# Patient Record
Sex: MALE | Race: WHITE | Employment: OTHER | URBAN - METROPOLITAN AREA
[De-identification: names, ages, dates, MRNs, and addresses within clinical notes are randomized per-mention and may not be internally consistent; named-entity substitution may affect disease eponyms.]

---

## 2017-08-24 ENCOUNTER — APPOINTMENT (OUTPATIENT)
Dept: GENERAL RADIOLOGY | Age: 62
DRG: 871 | End: 2017-08-24
Attending: EMERGENCY MEDICINE
Payer: COMMERCIAL

## 2017-08-24 ENCOUNTER — HOSPITAL ENCOUNTER (INPATIENT)
Age: 62
LOS: 3 days | Discharge: HOME | DRG: 871 | End: 2017-08-28
Attending: EMERGENCY MEDICINE | Admitting: FAMILY MEDICINE
Payer: COMMERCIAL

## 2017-08-24 DIAGNOSIS — E86.0 DEHYDRATION: ICD-10-CM

## 2017-08-24 DIAGNOSIS — J18.9 COMMUNITY ACQUIRED PNEUMONIA: Primary | ICD-10-CM

## 2017-08-24 DIAGNOSIS — E87.6 HYPOKALEMIA: ICD-10-CM

## 2017-08-24 LAB
ALBUMIN SERPL-MCNC: 2.5 G/DL (ref 3.5–5)
ALBUMIN/GLOB SERPL: 0.4 {RATIO} (ref 1.1–2.2)
ALP SERPL-CCNC: 108 U/L (ref 45–117)
ALT SERPL-CCNC: 52 U/L (ref 12–78)
ANION GAP SERPL CALC-SCNC: 6 MMOL/L (ref 5–15)
APPEARANCE UR: CLEAR
AST SERPL-CCNC: 62 U/L (ref 15–37)
BACTERIA URNS QL MICRO: NEGATIVE /HPF
BASOPHILS # BLD: 0 K/UL (ref 0–0.1)
BASOPHILS NFR BLD: 1 % (ref 0–1)
BILIRUB SERPL-MCNC: 0.4 MG/DL (ref 0.2–1)
BILIRUB UR QL CFM: NEGATIVE
BUN SERPL-MCNC: 23 MG/DL (ref 6–20)
BUN/CREAT SERPL: 28 (ref 12–20)
CALCIUM SERPL-MCNC: 8 MG/DL (ref 8.5–10.1)
CHLORIDE SERPL-SCNC: 100 MMOL/L (ref 97–108)
CO2 SERPL-SCNC: 30 MMOL/L (ref 21–32)
COLOR UR: ABNORMAL
CREAT SERPL-MCNC: 0.82 MG/DL (ref 0.7–1.3)
DIFFERENTIAL METHOD BLD: ABNORMAL
EOSINOPHIL # BLD: 0.1 K/UL (ref 0–0.4)
EOSINOPHIL NFR BLD: 2 % (ref 0–7)
EPITH CASTS URNS QL MICRO: ABNORMAL /LPF
ERYTHROCYTE [DISTWIDTH] IN BLOOD BY AUTOMATED COUNT: 14.9 % (ref 11.5–14.5)
GLOBULIN SER CALC-MCNC: 5.7 G/DL (ref 2–4)
GLUCOSE SERPL-MCNC: 105 MG/DL (ref 65–100)
GLUCOSE UR STRIP.AUTO-MCNC: NEGATIVE MG/DL
GRAN CASTS URNS QL MICRO: ABNORMAL /LPF
HCT VFR BLD AUTO: 34 % (ref 36.6–50.3)
HGB BLD-MCNC: 11.4 G/DL (ref 12.1–17)
HGB UR QL STRIP: ABNORMAL
HYALINE CASTS URNS QL MICRO: ABNORMAL /LPF (ref 0–5)
KETONES UR QL STRIP.AUTO: ABNORMAL MG/DL
LACTATE SERPL-SCNC: 1 MMOL/L (ref 0.4–2)
LEUKOCYTE ESTERASE UR QL STRIP.AUTO: NEGATIVE
LYMPHOCYTES # BLD: 0.5 K/UL (ref 0.8–3.5)
LYMPHOCYTES NFR BLD: 17 % (ref 12–49)
MCH RBC QN AUTO: 29.1 PG (ref 26–34)
MCHC RBC AUTO-ENTMCNC: 33.5 G/DL (ref 30–36.5)
MCV RBC AUTO: 86.7 FL (ref 80–99)
MONOCYTES # BLD: 0.3 K/UL (ref 0–1)
MONOCYTES NFR BLD: 11 % (ref 5–13)
MUCOUS THREADS URNS QL MICRO: ABNORMAL /LPF
NEUTS BAND NFR BLD MANUAL: 2 % (ref 0–6)
NEUTS SEG # BLD: 2 K/UL (ref 1.8–8)
NEUTS SEG NFR BLD: 67 % (ref 32–75)
NITRITE UR QL STRIP.AUTO: NEGATIVE
PH UR STRIP: 5.5 [PH] (ref 5–8)
PLATELET # BLD AUTO: 303 K/UL (ref 150–400)
POTASSIUM SERPL-SCNC: 2.6 MMOL/L (ref 3.5–5.1)
PROT SERPL-MCNC: 8.2 G/DL (ref 6.4–8.2)
PROT UR STRIP-MCNC: 300 MG/DL
RBC # BLD AUTO: 3.92 M/UL (ref 4.1–5.7)
RBC #/AREA URNS HPF: ABNORMAL /HPF (ref 0–5)
RBC MORPH BLD: ABNORMAL
SODIUM SERPL-SCNC: 136 MMOL/L (ref 136–145)
SP GR UR REFRACTOMETRY: 1.03 (ref 1–1.03)
UROBILINOGEN UR QL STRIP.AUTO: 1 EU/DL (ref 0.2–1)
WBC # BLD AUTO: 2.9 K/UL (ref 4.1–11.1)
WBC MORPH BLD: ABNORMAL
WBC URNS QL MICRO: ABNORMAL /HPF (ref 0–4)

## 2017-08-24 PROCEDURE — 83605 ASSAY OF LACTIC ACID: CPT | Performed by: EMERGENCY MEDICINE

## 2017-08-24 PROCEDURE — 96366 THER/PROPH/DIAG IV INF ADDON: CPT

## 2017-08-24 PROCEDURE — 96365 THER/PROPH/DIAG IV INF INIT: CPT

## 2017-08-24 PROCEDURE — 77030029684 HC NEB SM VOL KT MONA -A

## 2017-08-24 PROCEDURE — 81001 URINALYSIS AUTO W/SCOPE: CPT | Performed by: EMERGENCY MEDICINE

## 2017-08-24 PROCEDURE — 99284 EMERGENCY DEPT VISIT MOD MDM: CPT

## 2017-08-24 PROCEDURE — 74011000258 HC RX REV CODE- 258: Performed by: EMERGENCY MEDICINE

## 2017-08-24 PROCEDURE — 74011250636 HC RX REV CODE- 250/636: Performed by: EMERGENCY MEDICINE

## 2017-08-24 PROCEDURE — 36415 COLL VENOUS BLD VENIPUNCTURE: CPT | Performed by: STUDENT IN AN ORGANIZED HEALTH CARE EDUCATION/TRAINING PROGRAM

## 2017-08-24 PROCEDURE — 96375 TX/PRO/DX INJ NEW DRUG ADDON: CPT

## 2017-08-24 PROCEDURE — 85025 COMPLETE CBC W/AUTO DIFF WBC: CPT | Performed by: STUDENT IN AN ORGANIZED HEALTH CARE EDUCATION/TRAINING PROGRAM

## 2017-08-24 PROCEDURE — 71020 XR CHEST PA LAT: CPT

## 2017-08-24 PROCEDURE — 87040 BLOOD CULTURE FOR BACTERIA: CPT | Performed by: EMERGENCY MEDICINE

## 2017-08-24 PROCEDURE — 74011250637 HC RX REV CODE- 250/637: Performed by: EMERGENCY MEDICINE

## 2017-08-24 PROCEDURE — 80053 COMPREHEN METABOLIC PANEL: CPT | Performed by: STUDENT IN AN ORGANIZED HEALTH CARE EDUCATION/TRAINING PROGRAM

## 2017-08-24 RX ORDER — KETOROLAC TROMETHAMINE 30 MG/ML
30 INJECTION, SOLUTION INTRAMUSCULAR; INTRAVENOUS ONCE
Status: DISCONTINUED | OUTPATIENT
Start: 2017-08-24 | End: 2017-08-24

## 2017-08-24 RX ORDER — ONDANSETRON 2 MG/ML
4 INJECTION INTRAMUSCULAR; INTRAVENOUS ONCE
Status: COMPLETED | OUTPATIENT
Start: 2017-08-24 | End: 2017-08-24

## 2017-08-24 RX ORDER — IBUPROFEN 600 MG/1
600 TABLET ORAL
Status: COMPLETED | OUTPATIENT
Start: 2017-08-24 | End: 2017-08-24

## 2017-08-24 RX ORDER — GUAIFENESIN 100 MG/5ML
200 SOLUTION ORAL
Status: COMPLETED | OUTPATIENT
Start: 2017-08-24 | End: 2017-08-24

## 2017-08-24 RX ORDER — ESOMEPRAZOLE MAGNESIUM 40 MG/1
40 CAPSULE, DELAYED RELEASE ORAL
COMMUNITY

## 2017-08-24 RX ORDER — POTASSIUM CHLORIDE 20MEQ/15ML
40 LIQUID (ML) ORAL
Status: COMPLETED | OUTPATIENT
Start: 2017-08-24 | End: 2017-08-24

## 2017-08-24 RX ORDER — ALBUTEROL SULFATE 0.83 MG/ML
2.5 SOLUTION RESPIRATORY (INHALATION)
Status: COMPLETED | OUTPATIENT
Start: 2017-08-24 | End: 2017-08-25

## 2017-08-24 RX ORDER — SENNOSIDES 8.6 MG/1
1 TABLET ORAL DAILY
COMMUNITY
End: 2019-02-18

## 2017-08-24 RX ORDER — LORATADINE 10 MG/1
10 TABLET ORAL DAILY
COMMUNITY

## 2017-08-24 RX ORDER — TAMSULOSIN HYDROCHLORIDE 0.4 MG/1
0.4 CAPSULE ORAL
COMMUNITY
End: 2019-02-18

## 2017-08-24 RX ADMIN — POTASSIUM CHLORIDE 40 MEQ: 20 SOLUTION ORAL at 23:22

## 2017-08-24 RX ADMIN — SODIUM CHLORIDE 1000 ML: 900 INJECTION, SOLUTION INTRAVENOUS at 22:52

## 2017-08-24 RX ADMIN — IBUPROFEN 600 MG: 600 TABLET, FILM COATED ORAL at 23:24

## 2017-08-24 RX ADMIN — GUAIFENESIN 200 MG: 100 SOLUTION ORAL at 23:23

## 2017-08-24 RX ADMIN — ONDANSETRON 4 MG: 2 INJECTION INTRAMUSCULAR; INTRAVENOUS at 23:23

## 2017-08-24 RX ADMIN — CEFTRIAXONE SODIUM 2 G: 2 INJECTION, POWDER, FOR SOLUTION INTRAMUSCULAR; INTRAVENOUS at 22:56

## 2017-08-24 RX ADMIN — AZITHROMYCIN MONOHYDRATE 500 MG: 500 INJECTION, POWDER, LYOPHILIZED, FOR SOLUTION INTRAVENOUS at 23:29

## 2017-08-24 NOTE — IP AVS SNAPSHOT
[address] [facility] [address]ounger [address] 
[phone] Patient: Orquidea Valencia MRN: [id number] LCA:[id number] You are allergic to the following Allergen Reactions Percocet (Oxycodone-Acetaminophen) Rash Recent Documentation Height Weight BMI Smoking Status 1.803 m 70.9 kg 21.79 kg/m2 Never Smoker Unresulted Labs Order Current Status FREE LIGHT CHAINS, KAPPA/LAMBDA, QT In process PROTEIN ELECTROPHORESIS In process CULTURE, BLOOD, PAIRED Preliminary result Emergency Contacts  (Rel.) Home Phone Work Phone Mobile Phone Dhruv Guillaume [phone] -- [phone] About your hospitalization You were admitted on:  August 25, 2017 You last received care in the:  [facility] You were discharged on:  August 28, 2017 Why you were hospitalized Your primary diagnosis was: Infection Due To Parainfluenza Virus 4 Your diagnoses also included:  Acute Hypokalemia, Tachycardia, Sepsis (Hcc), Multiple Myeloma (Hcc) Providers Seen During Your Hospitalizations Provider Role Specialty Primary office phone Jm Stevens MD Attending Provider Emergency Medicine [phone] Treva Márquez MD Attending Provider [facility] [phone] Omar Galloway MD Attending Provider Hospitalist [phone] Your Primary Care Physician (PCP) Primary Care Physician Office Phone Office Fax L[place], [address] [facility] [phone] Follow-up Information Follow up With Details Comments Contact Info Dario Landin MD Call in 1 day  [address] [facility] [address] [facility] [address] [phone] 
[phone] Current Discharge Medication List  
  
START taking these medications Dose & Instructions Dispensing Information Comments Morning Noon Evening Bedtime  
 albuterol 90 mcg/actuation inhaler Commonly known as:  PROVENTIL HFA, VENTOLIN HFA, PROAIR HFA Your last dose was: Your next dose is:    
   
   
 Dose:  1 Puff Take 1 Puff by inhalation every four (4) hours as needed for Wheezing or Shortness of Breath. Quantity:  1 Inhaler Refills:  2  
     
   
   
   
  
 fluticasone 50 mcg/actuation nasal spray Commonly known as:  Halie Falk Your last dose was: Your next dose is:    
   
   
 Dose:  2 Spray 2 Sprays by Both Nostrils route daily. Quantity:  1 Bottle Refills:  2  
     
   
   
   
  
 guaiFENesin 1,200 mg Ta12 ER tablet Commonly known as:  Tripl Your last dose was: Your next dose is:    
   
   
 Dose:  1200 mg Take 1 Tab by mouth two (2) times a day for 7 days. Indications: COUGH Quantity:  14 Tab Refills:  0  
     
   
   
   
  
 guaiFENesin-dextromethorphan 100-10 mg/5 mL syrup Commonly known as:  ROBITUSSIN DM Your last dose was: Your next dose is:    
   
   
 Dose:  10 mL Take 10 mL by mouth every six (6) hours as needed for up to 10 days. Indications: COUGH Quantity:  1 Bottle Refills:  0 CONTINUE these medications which have NOT CHANGED Dose & Instructions Dispensing Information Comments Morning Noon Evening Bedtime  
 acyclovir 400 mg tablet Commonly known as:  ZOVIRAX Your last dose was: Your next dose is:    
   
   
 Dose:  400 mg Take 400 mg by mouth two (2) times a day. Refills:  0  
     
   
   
   
  
 ASPIRIN CHILD PO Your last dose was: Your next dose is:    
   
   
 Dose:  81 mg Take 81 mg by mouth daily. Refills:  0  
     
   
   
   
  
 CALCIUM 600 + D 600-125 mg-unit Tab Generic drug:  calcium-cholecalciferol (d3) Your last dose was: Your next dose is:    
   
   
 Dose:  2 Tab Take 2 Tabs by mouth two (2) times a day. Refills:  0  
     
   
   
   
  
 citalopram 40 mg tablet Commonly known as:  Antony Ralph Your last dose was: Your next dose is:    
   
   
 Dose:  40 mg Take 40 mg by mouth daily. Refills:  0  
     
   
   
   
  
 esomeprazole 40 mg capsule Commonly known as:  Julio Cesar Medel Your last dose was: Your next dose is:    
   
   
 Dose:  40 mg Take 40 mg by mouth daily. Refills:  0  
     
   
   
   
  
 fentaNYL 100 mcg/hr PATCH Commonly known as:  Milton Cordon Your last dose was: Your next dose is:    
   
   
 Dose:  1 Patch 1 Patch by TransDERmal route every fourty-eight (48) hours. Refills:  0 HYDROmorphone 2 mg tablet Commonly known as:  DILAUDID Your last dose was: Your next dose is:    
   
   
 Dose:  2 mg Take 2 mg by mouth every four (4) hours as needed for Pain. Refills:  0  
     
   
   
   
  
 loratadine 10 mg tablet Commonly known as:  Luciano Munguia Your last dose was: Your next dose is:    
   
   
 Dose:  10 mg Take 10 mg by mouth daily. Refills:  0 Senna 8.6 mg tablet Generic drug:  senna Your last dose was: Your next dose is:    
   
   
 Dose:  1 Tab Take 1 Tab by mouth daily. Refills:  0 STOOL SOFTENER PO Your last dose was: Your next dose is:    
   
   
 Dose:  2 Cap Take 2 Caps by mouth two (2) times a day. Refills:  0  
     
   
   
   
  
 tamsulosin 0.4 mg capsule Commonly known as:  FLOMAX Your last dose was: Your next dose is:    
   
   
 Dose:  0.4 mg Take 0.4 mg by mouth nightly. Refills:  0 Where to Get Your Medications Information on where to get these meds will be given to you by the nurse or doctor. ! Ask your nurse or doctor about these medications  
  albuterol 90 mcg/actuation inhaler  
 fluticasone 50 mcg/actuation nasal spray  
 guaiFENesin 1,200 mg Ta12 ER tablet guaiFENesin-dextromethorphan 100-10 mg/5 mL syrup Discharge Instructions Discharge Instructions PATIENT ID: Clara Berrios MRN: [id number] YOB: 1955 DATE OF ADMISSION: 8/24/2017  9:46 PM   
DATE OF DISCHARGE: 8/28/2017 PRIMARY CARE PROVIDER: Nino Bonilla MD  
 
ATTENDING PHYSICIAN: Varun Green MD 
DISCHARGING PROVIDER: Andree Kanner, NP To contact this individual call [phone] and ask the  to page. If unavailable ask to be transferred the Adult Hospitalist Department. DISCHARGE DIAGNOSES Flu, Sepsis CONSULTATIONS: IP CONSULT TO ONCOLOGY 
IP CONSULT TO HEMATOLOGY PROCEDURES/SURGERIES: * No surgery found * PENDING TEST RESULTS:  
At the time of discharge the following test results are still pending: none FOLLOW UP APPOINTMENTS:  
Follow-up Information Follow up With Details Comments Contact Info Barbara Harris MD Call in 1 day  [address] [facility] [address] [facility] [address] [address] [phone] ADDITIONAL CARE RECOMMENDATIONS:  
1. Make sure to get plenty of rest and drink lots of water. 2. Please use your incentive spirometer 10 times a hour while awake. 3. Recommending you take Mucinex to help decrease your congestion. DIET: Regular ACTIVITY: as tolerated WOUND CARE: none EQUIPMENT needed: none DISCHARGE MEDICATIONS: 
 See Medication Reconciliation Form · It is important that you take the medication exactly as they are prescribed. · Keep your medication in the bottles provided by the pharmacist and keep a list of the medication names, dosages, and times to be taken in your wallet. · Do not take other medications without consulting your doctor. NOTIFY YOUR PHYSICIAN FOR ANY OF THE FOLLOWING:  
Fever over 101 degrees for 24 hours. Chest pain, shortness of breath, fever, chills, nausea, vomiting, diarrhea, change in mentation, falling, weakness, bleeding.  Severe pain or pain not relieved by medications. Or, any other signs or symptoms that you may have questions about. DISPOSITION: 
X  Home With: 
 OT  PT  [facility]  RN  
  
 SNF/Inpatient Rehab/LTAC Independent/assisted living Hospice Other: CDMP Checked: Yes X PROBLEM LIST Updated: Yes X Signed:  
Edouard Ortiz NP 
8/28/2017 
12:08 PM 
 
Discharge Instructions Attachments/References INFLUENZA (ENGLISH) Discharge Orders None [email or web address] Announcement We are excited to announce that we are making your provider's discharge notes available to you in [email or web address]. You will see these notes when they are completed and signed by the physician that discharged you from your recent hospital stay. If you have any questions or concerns about any information you see in [email or web address], please call the Health Information Department where you were seen or reach out to your Primary Care Provider for more information about your plan of care. Introducing [facility]! Dear Diana Pod: 
Thank you for requesting a [email or web address] account. Our records indicate that you already have an active [email or web address] account. You can access your account anytime at [email or web address] Did you know that you can access your hospital and ER discharge instructions at any time in [email or web address]? You can also review all of your test results from your hospital stay or ER visit. Additional Information If you have questions, please visit the Frequently Asked Questions section of the [email or web address] website at [email or web address] Remember, [email or web address] is NOT to be used for urgent needs. For medical emergencies, dial 911. Now available from your iPhone and Android! General Information Please provide this summary of care documentation to your next provider. Patient Signature:  ____________________________________________________________ Date:  ____________________________________________________________  
  
Katie De Leon Provider Signature:  ____________________________________________________________ Date:  ____________________________________________________________

## 2017-08-25 ENCOUNTER — APPOINTMENT (OUTPATIENT)
Dept: CT IMAGING | Age: 62
DRG: 871 | End: 2017-08-25
Attending: FAMILY MEDICINE
Payer: COMMERCIAL

## 2017-08-25 ENCOUNTER — APPOINTMENT (OUTPATIENT)
Dept: GENERAL RADIOLOGY | Age: 62
DRG: 871 | End: 2017-08-25
Attending: INTERNAL MEDICINE
Payer: COMMERCIAL

## 2017-08-25 PROBLEM — R50.9 FEVER: Status: ACTIVE | Noted: 2017-08-25

## 2017-08-25 PROBLEM — R00.0 TACHYCARDIA: Status: ACTIVE | Noted: 2017-08-25

## 2017-08-25 PROBLEM — E87.6 ACUTE HYPOKALEMIA: Status: ACTIVE | Noted: 2017-08-25

## 2017-08-25 LAB
ARTERIAL PATENCY WRIST A: YES
ATRIAL RATE: 79 BPM
B PERT DNA SPEC QL NAA+PROBE: NOT DETECTED
BASE EXCESS BLD CALC-SCNC: 2 MMOL/L
BDY SITE: ABNORMAL
C PNEUM DNA SPEC QL NAA+PROBE: NOT DETECTED
CALCULATED P AXIS, ECG09: 34 DEGREES
CALCULATED R AXIS, ECG10: -38 DEGREES
CALCULATED T AXIS, ECG11: 115 DEGREES
DIAGNOSIS, 93000: NORMAL
FLUAV H1 2009 PAND RNA SPEC QL NAA+PROBE: NOT DETECTED
FLUAV H1 RNA SPEC QL NAA+PROBE: NOT DETECTED
FLUAV H3 RNA SPEC QL NAA+PROBE: NOT DETECTED
FLUAV SUBTYP SPEC NAA+PROBE: NOT DETECTED
FLUBV RNA SPEC QL NAA+PROBE: NOT DETECTED
GAS FLOW.O2 O2 DELIVERY SYS: ABNORMAL L/MIN
GAS FLOW.O2 SETTING OXYMISER: 3 L/M
HADV DNA SPEC QL NAA+PROBE: NOT DETECTED
HCO3 BLD-SCNC: 26.6 MMOL/L (ref 22–26)
HCOV 229E RNA SPEC QL NAA+PROBE: NOT DETECTED
HCOV HKU1 RNA SPEC QL NAA+PROBE: NOT DETECTED
HCOV NL63 RNA SPEC QL NAA+PROBE: NOT DETECTED
HCOV OC43 RNA SPEC QL NAA+PROBE: NOT DETECTED
HMPV RNA SPEC QL NAA+PROBE: NOT DETECTED
HPIV1 RNA SPEC QL NAA+PROBE: NOT DETECTED
HPIV2 RNA SPEC QL NAA+PROBE: NOT DETECTED
HPIV3 RNA SPEC QL NAA+PROBE: NOT DETECTED
HPIV4 RNA SPEC QL NAA+PROBE: DETECTED
M PNEUMO DNA SPEC QL NAA+PROBE: NOT DETECTED
MAGNESIUM SERPL-MCNC: 2 MG/DL (ref 1.6–2.4)
O2/TOTAL GAS SETTING VFR VENT: 0.28 %
P-R INTERVAL, ECG05: 192 MS
PCO2 BLD: 44.5 MMHG (ref 35–45)
PH BLD: 7.38 [PH] (ref 7.35–7.45)
PO2 BLD: 64 MMHG (ref 80–100)
POTASSIUM SERPL-SCNC: 3.8 MMOL/L (ref 3.5–5.1)
Q-T INTERVAL, ECG07: 490 MS
QRS DURATION, ECG06: 174 MS
QTC CALCULATION (BEZET), ECG08: 561 MS
RSV RNA SPEC QL NAA+PROBE: NOT DETECTED
RV+EV RNA SPEC QL NAA+PROBE: NOT DETECTED
SAO2 % BLD: 92 % (ref 92–97)
SPECIMEN TYPE: ABNORMAL
TOTAL RESP. RATE, ITRR: 15
VENTRICULAR RATE, ECG03: 79 BPM

## 2017-08-25 PROCEDURE — 74011000258 HC RX REV CODE- 258: Performed by: FAMILY MEDICINE

## 2017-08-25 PROCEDURE — 74011250636 HC RX REV CODE- 250/636: Performed by: FAMILY MEDICINE

## 2017-08-25 PROCEDURE — 83735 ASSAY OF MAGNESIUM: CPT | Performed by: NURSE PRACTITIONER

## 2017-08-25 PROCEDURE — 87070 CULTURE OTHR SPECIMN AEROBIC: CPT | Performed by: HOSPITALIST

## 2017-08-25 PROCEDURE — 93041 RHYTHM ECG TRACING: CPT

## 2017-08-25 PROCEDURE — 84132 ASSAY OF SERUM POTASSIUM: CPT | Performed by: NURSE PRACTITIONER

## 2017-08-25 PROCEDURE — 74011636320 HC RX REV CODE- 636/320: Performed by: EMERGENCY MEDICINE

## 2017-08-25 PROCEDURE — 74011250637 HC RX REV CODE- 250/637: Performed by: NURSE PRACTITIONER

## 2017-08-25 PROCEDURE — 74011250636 HC RX REV CODE- 250/636: Performed by: HOSPITALIST

## 2017-08-25 PROCEDURE — 87798 DETECT AGENT NOS DNA AMP: CPT | Performed by: NURSE PRACTITIONER

## 2017-08-25 PROCEDURE — 71010 XR CHEST PORT: CPT

## 2017-08-25 PROCEDURE — 74011000258 HC RX REV CODE- 258: Performed by: EMERGENCY MEDICINE

## 2017-08-25 PROCEDURE — 36415 COLL VENOUS BLD VENIPUNCTURE: CPT | Performed by: NURSE PRACTITIONER

## 2017-08-25 PROCEDURE — 82803 BLOOD GASES ANY COMBINATION: CPT

## 2017-08-25 PROCEDURE — 74011250637 HC RX REV CODE- 250/637: Performed by: HOSPITALIST

## 2017-08-25 PROCEDURE — 74011250636 HC RX REV CODE- 250/636: Performed by: NURSE PRACTITIONER

## 2017-08-25 PROCEDURE — 94640 AIRWAY INHALATION TREATMENT: CPT

## 2017-08-25 PROCEDURE — 71275 CT ANGIOGRAPHY CHEST: CPT

## 2017-08-25 PROCEDURE — 74011000250 HC RX REV CODE- 250: Performed by: EMERGENCY MEDICINE

## 2017-08-25 PROCEDURE — 36600 WITHDRAWAL OF ARTERIAL BLOOD: CPT

## 2017-08-25 PROCEDURE — 77010033678 HC OXYGEN DAILY

## 2017-08-25 PROCEDURE — 74177 CT ABD & PELVIS W/CONTRAST: CPT

## 2017-08-25 PROCEDURE — 65270000029 HC RM PRIVATE

## 2017-08-25 RX ORDER — SODIUM CHLORIDE 9 MG/ML
100 INJECTION, SOLUTION INTRAVENOUS CONTINUOUS
Status: DISCONTINUED | OUTPATIENT
Start: 2017-08-25 | End: 2017-08-26

## 2017-08-25 RX ORDER — SODIUM CHLORIDE 0.9 % (FLUSH) 0.9 %
5-10 SYRINGE (ML) INJECTION AS NEEDED
Status: DISCONTINUED | OUTPATIENT
Start: 2017-08-25 | End: 2017-08-28 | Stop reason: HOSPADM

## 2017-08-25 RX ORDER — GUAIFENESIN/DEXTROMETHORPHAN 100-10MG/5
10 SYRUP ORAL
Status: DISCONTINUED | OUTPATIENT
Start: 2017-08-25 | End: 2017-08-28 | Stop reason: HOSPADM

## 2017-08-25 RX ORDER — CITALOPRAM 40 MG/1
40 TABLET, FILM COATED ORAL DAILY
COMMUNITY

## 2017-08-25 RX ORDER — KETOROLAC TROMETHAMINE 30 MG/ML
15 INJECTION, SOLUTION INTRAMUSCULAR; INTRAVENOUS
Status: DISCONTINUED | OUTPATIENT
Start: 2017-08-25 | End: 2017-08-28 | Stop reason: HOSPADM

## 2017-08-25 RX ORDER — FENTANYL 100 UG/H
1 PATCH TRANSDERMAL
Status: DISCONTINUED | OUTPATIENT
Start: 2017-08-25 | End: 2017-08-28 | Stop reason: HOSPADM

## 2017-08-25 RX ORDER — SODIUM CHLORIDE 0.9 % (FLUSH) 0.9 %
5-10 SYRINGE (ML) INJECTION EVERY 8 HOURS
Status: DISCONTINUED | OUTPATIENT
Start: 2017-08-25 | End: 2017-08-28 | Stop reason: HOSPADM

## 2017-08-25 RX ORDER — SODIUM CHLORIDE 0.9 % (FLUSH) 0.9 %
10 SYRINGE (ML) INJECTION
Status: COMPLETED | OUTPATIENT
Start: 2017-08-25 | End: 2017-08-25

## 2017-08-25 RX ORDER — ONDANSETRON 2 MG/ML
4 INJECTION INTRAMUSCULAR; INTRAVENOUS
Status: DISCONTINUED | OUTPATIENT
Start: 2017-08-25 | End: 2017-08-28 | Stop reason: HOSPADM

## 2017-08-25 RX ORDER — TAMSULOSIN HYDROCHLORIDE 0.4 MG/1
0.4 CAPSULE ORAL DAILY
Status: DISCONTINUED | OUTPATIENT
Start: 2017-08-26 | End: 2017-08-25

## 2017-08-25 RX ORDER — IPRATROPIUM BROMIDE AND ALBUTEROL SULFATE 2.5; .5 MG/3ML; MG/3ML
3 SOLUTION RESPIRATORY (INHALATION)
Status: DISCONTINUED | OUTPATIENT
Start: 2017-08-25 | End: 2017-08-28 | Stop reason: HOSPADM

## 2017-08-25 RX ORDER — ENOXAPARIN SODIUM 100 MG/ML
40 INJECTION SUBCUTANEOUS EVERY 24 HOURS
Status: DISCONTINUED | OUTPATIENT
Start: 2017-08-25 | End: 2017-08-28 | Stop reason: HOSPADM

## 2017-08-25 RX ORDER — TAMSULOSIN HYDROCHLORIDE 0.4 MG/1
0.4 CAPSULE ORAL
Status: DISCONTINUED | OUTPATIENT
Start: 2017-08-25 | End: 2017-08-28 | Stop reason: HOSPADM

## 2017-08-25 RX ORDER — CITALOPRAM 20 MG/1
40 TABLET, FILM COATED ORAL
Status: DISCONTINUED | OUTPATIENT
Start: 2017-08-26 | End: 2017-08-28 | Stop reason: HOSPADM

## 2017-08-25 RX ADMIN — GUAIFENESIN AND DEXTROMETHORPHAN 10 ML: 100; 10 SYRUP ORAL at 19:35

## 2017-08-25 RX ADMIN — SODIUM CHLORIDE 100 ML/HR: 900 INJECTION, SOLUTION INTRAVENOUS at 19:00

## 2017-08-25 RX ADMIN — Medication 10 ML: at 21:47

## 2017-08-25 RX ADMIN — AZITHROMYCIN MONOHYDRATE 500 MG: 500 INJECTION, POWDER, LYOPHILIZED, FOR SOLUTION INTRAVENOUS at 23:47

## 2017-08-25 RX ADMIN — SODIUM CHLORIDE 1000 ML: 900 INJECTION, SOLUTION INTRAVENOUS at 02:24

## 2017-08-25 RX ADMIN — SODIUM CHLORIDE 100 ML: 900 INJECTION, SOLUTION INTRAVENOUS at 02:09

## 2017-08-25 RX ADMIN — Medication 10 ML: at 13:25

## 2017-08-25 RX ADMIN — SODIUM CHLORIDE 125 ML/HR: 900 INJECTION, SOLUTION INTRAVENOUS at 11:23

## 2017-08-25 RX ADMIN — ENOXAPARIN SODIUM 40 MG: 40 INJECTION SUBCUTANEOUS at 18:58

## 2017-08-25 RX ADMIN — KETOROLAC TROMETHAMINE 15 MG: 30 INJECTION, SOLUTION INTRAMUSCULAR at 17:32

## 2017-08-25 RX ADMIN — IOPAMIDOL 100 ML: 755 INJECTION, SOLUTION INTRAVENOUS at 02:10

## 2017-08-25 RX ADMIN — ONDANSETRON 4 MG: 2 INJECTION INTRAMUSCULAR; INTRAVENOUS at 13:25

## 2017-08-25 RX ADMIN — ALBUTEROL SULFATE 2.5 MG: 2.5 SOLUTION RESPIRATORY (INHALATION) at 00:06

## 2017-08-25 RX ADMIN — Medication 10 ML: at 02:10

## 2017-08-25 RX ADMIN — CEFTRIAXONE 1 G: 1 INJECTION, POWDER, FOR SOLUTION INTRAMUSCULAR; INTRAVENOUS at 21:51

## 2017-08-25 RX ADMIN — Medication 10 ML: at 06:00

## 2017-08-25 RX ADMIN — ONDANSETRON 4 MG: 2 INJECTION INTRAMUSCULAR; INTRAVENOUS at 19:01

## 2017-08-25 RX ADMIN — SODIUM CHLORIDE 125 ML/HR: 900 INJECTION, SOLUTION INTRAVENOUS at 02:24

## 2017-08-25 RX ADMIN — TAMSULOSIN HYDROCHLORIDE 0.4 MG: 0.4 CAPSULE ORAL at 22:03

## 2017-08-25 NOTE — CONSULTS
Pt. Is a 57 y/o man with myeloma who is seen  After admission for fever,  Pt.  Examined, chart reviewed   Consult dictated  Aylin Segundo MD

## 2017-08-25 NOTE — PROGRESS NOTES
Hospitalist Progress Note  Ruby Plascencia NP  Office: [phone]  Cell: [phone]      Date of Service:  2017  NAME:  Donovan Sheikh  :  1955  MRN:  [id number]      Admission Summary:   Mr. Lita Wilson is a 59 yo male with PMH significant for multiple myeloma and s/p stem cell transplant x 2. Presented to ED on  with fever, fatigue, feeling unwell, nausea/vomiting. Actively on chemotherapy followed by Dr. Racquel Mishra. Admitted for sepsis treatment and work up. Interval history / Subjective:   Still feeling generally unwell and nauseated. No diarrhea since Wednesday but no appetite at this time. He was afebrile all morning and spiked low grade fever last night. Reports sick contact with grandchildren this weekend who were diagnosed with viral illness.       Assessment & Plan:     Sepsis (POA)  - Fluid resuscitated and will continue IVF but decrease rate slightly  - Continue Azithro and Rocephin for PNA pending blood cultures  - Blood cultures pending  - UA negative  - Send viral respiratory PCR  - Lactate ok    Possible CAP (POA) with some dyspnea  - CT chest with possible infectious etiology and B/L tree-in-budd opacities  - CTA negative for PE  - Given immunocompromised will treat as infection  - Wean O2 as able    Multiple myeloma  - Followed by Dr. Racquel Mishra who has seen today and evaluated  - Chemo will be on hold until infection ruled out    Hypokalemia (POA)  - K+ 2.6 on admission and now 3.8 on recheck    Dehydration  - Renal function ok on arrival but minimal UO suspect due to poor PO intake and sepsis  - Responded well to IVF and UO much better    Generalized abdominal pain  - Resolved and described more as nauseated  - No acute process on CT A/P and only 1 episode of diarrhea on Wednesday  - D/C c diff testing as no stool     Code status: Full  DVT prophylaxis: Start Lovenox    Care Plan discussed with: Patient, Wife, RN, Attending  Disposition: Home when medically improved     Hospital Problems  Date Reviewed: 8/25/2017          Codes Class Noted POA    Acute hypokalemia ICD-10-CM: E87.6  ICD-9-CM: 276.8  8/25/2017 Unknown        Tachycardia ICD-10-CM: R00.0  ICD-9-CM: 785.0  8/25/2017 Unknown        * (Principal)Fever ICD-10-CM: R50.9  ICD-9-CM: 780.60  8/25/2017 Unknown                Review of Systems:   Denied CP, + non-productive cough when he talks, SOB not an issue when laying down, + nausea, no stool       Vital Signs:    Last 24hrs VS reviewed since prior progress note. Most recent are:  Visit Vitals    /67 (BP 1 Location: Right arm, BP Patient Position: At rest)    Pulse 90    Temp (!) 100.6 °F (38.1 °C)    Resp 18    Ht 5' 11\" (1.803 m)    Wt 70.9 kg (156 lb 3.2 oz)    SpO2 94%    BMI 21.79 kg/m2         Intake/Output Summary (Last 24 hours) at 08/25/17 1622  Last data filed at 08/25/17 1547   Gross per 24 hour   Intake             1579 ml   Output              950 ml   Net              629 ml        Physical Examination:             Constitutional:  No acute distress, cooperative, pleasant but looks unwell and ashen   ENT:  Oral mucous moist, oropharynx benign. Neck supple   Resp:  Scattered rhonchi worse to RUL but decent aeration. No accessory muscle use   CV:  Regular rhythm, normal rate, no murmurs, gallops, rubs    GI:  Soft, non distended, non tender. normoactive bowel sounds, no hepatosplenomegaly     Musculoskeletal:  No edema, warm, 2+ pulses throughout. WATSON with 5/5 strength    Neurologic:  Moves all extremities. AAOx3, CN II-XII reviewed and exam is non-focal     Psych:  Good insight, Not anxious nor agitated. Skin:  Good turgor, no rashes or ulcers  Eyes:  EOMI. Anicteric sclerae, PERRL.        Data Review:    Review and/or order of clinical lab test  Review and/or order of tests in the radiology section of CPT  Review and/or order of tests in the medicine section of CPT      Labs:     Recent Labs      08/24/17 2156   WBC  2.9*   HGB  11.4*   HCT  34.0*   PLT  303     Recent Labs      08/25/17   1258  08/24/17 2156   NA   --   136   K  3.8  2.6*   CL   --   100   CO2   --   30   BUN   --   23*   CREA   --   0.82   GLU   --   105*   CA   --   8.0*   MG  2.0   --      Recent Labs      08/24/17 2156   SGOT  62*   ALT  52   AP  108   TBILI  0.4   TP  8.2   ALB  2.5*   GLOB  5.7*     No results for input(s): INR, PTP, APTT in the last 72 hours. No lab exists for component: INREXT   No results for input(s): FE, TIBC, PSAT, FERR in the last 72 hours. No results found for: FOL, RBCF   No results for input(s): PH, PCO2, PO2 in the last 72 hours. No results for input(s): CPK, CKNDX, TROIQ in the last 72 hours.     No lab exists for component: CPKMB  No results found for: CHOL, CHOLX, CHLST, CHOLV, HDL, LDL, LDLC, DLDLP, TGLX, TRIGL, TRIGP, CHHD, CHHDX  No results found for: [facility]  Lab Results   Component Value Date/Time    Color DARK YELLOW 08/24/2017 11:04 PM    Appearance CLEAR 08/24/2017 11:04 PM    Specific gravity 1.030 08/24/2017 11:04 PM    pH (UA) 5.5 08/24/2017 11:04 PM    Protein 300 08/24/2017 11:04 PM    Glucose NEGATIVE  08/24/2017 11:04 PM    Ketone TRACE 08/24/2017 11:04 PM    Bilirubin NEGATIVE  12/31/2015 04:47 PM    Urobilinogen 1.0 08/24/2017 11:04 PM    Nitrites NEGATIVE  08/24/2017 11:04 PM    Leukocyte Esterase NEGATIVE  08/24/2017 11:04 PM    Epithelial cells FEW 08/24/2017 11:04 PM    Bacteria NEGATIVE  08/24/2017 11:04 PM    WBC 0-4 08/24/2017 11:04 PM    RBC 5-10 08/24/2017 11:04 PM         Medications Reviewed:     Current Facility-Administered Medications   Medication Dose Route Frequency    sodium chloride (NS) flush 5-10 mL  5-10 mL IntraVENous Q8H    sodium chloride (NS) flush 5-10 mL  5-10 mL IntraVENous PRN    0.9% sodium chloride infusion  100 mL/hr IntraVENous CONTINUOUS    cefTRIAXone (ROCEPHIN) 1 g in 0.9% sodium chloride (MBP/ADV) 50 mL  1 g IntraVENous Q24H  albuterol-ipratropium (DUO-NEB) 2.5 MG-0.5 MG/3 ML  3 mL Nebulization Q4H PRN    azithromycin (ZITHROMAX) 500 mg in 0.9% sodium chloride (MBP/ADV) 250 mL  500 mg IntraVENous Q24H    fentaNYL (DURAGESIC) 100 mcg/hr patch 1 Patch  1 Patch TransDERmal Q48H    ondansetron (ZOFRAN) injection 4 mg  4 mg IntraVENous Q6H PRN    tamsulosin (FLOMAX) capsule 0.4 mg  0.4 mg Oral QHS    [START ON 8/26/2017] citalopram (CELEXA) tablet 40 mg  40 mg Oral 7am     ______________________________________________________________________  EXPECTED LENGTH OF STAY: 2d 19h  ACTUAL LENGTH OF STAY:          0                 Lee Ann Garzon NP

## 2017-08-25 NOTE — ROUTINE PROCESS
TRANSFER - OUT REPORT:    Verbal report given to VEL Negro(name) on Carol Ratliff  being transferred to 6E(unit) for routine progression of care       Report consisted of patients Situation, Background, Assessment and   Recommendations(SBAR). Information from the following report(s) SBAR, Kardex, ED Summary, [facility] and Cardiac Rhythm NSR was reviewed with the receiving nurse. Lines:   Peripheral IV 08/24/17 Left Arm (Active)   Site Assessment Clean, dry, & intact 8/24/2017  9:50 PM   Phlebitis Assessment 0 8/24/2017  9:50 PM   Infiltration Assessment 0 8/24/2017  9:50 PM   Dressing Status Clean, dry, & intact 8/24/2017  9:50 PM       Peripheral IV 08/24/17 Right Wrist (Active)   Site Assessment Clean, dry, & intact 8/24/2017  9:50 PM   Phlebitis Assessment 0 8/24/2017  9:50 PM   Infiltration Assessment 0 8/24/2017  9:50 PM   Dressing Status Clean, dry, & intact 8/24/2017  9:50 PM        Opportunity for questions and clarification was provided.       Patient transported with:   O2 @ 2 liters        Visit Vitals    /67 (BP 1 Location: Right arm, BP Patient Position: At rest)    Pulse 79    Temp 97.3 °F (36.3 °C)    Resp 16    Ht 5' 11\" (1.803 m)    Wt 70.9 kg (156 lb 3.2 oz)    SpO2 97%    BMI 21.79 kg/m2

## 2017-08-25 NOTE — ED TRIAGE NOTES
TRIAGE NOTE: PT arrives for shortness of breath, cough, diarrhea, nausea, vomiting for a week.   Pt a multiple myleoma patient

## 2017-08-25 NOTE — PROGRESS NOTES
Patient seen and examined independently. He feels better, still very congested and has a productive cough. On exam, he's tachycardic, lungs are rhonchorous bilaterally. I agree with the NP's A&P. Will also send sputum for culture, guaifenesin-dextramethorphan for cough, IV ketorolac for pain related to cough.

## 2017-08-25 NOTE — PROGRESS NOTES
Admission Medication Reconciliation:    Information obtained from: Patient and spouse    Significant PMH/Disease States:   Past Medical History:   Diagnosis Date    Hypercholesteremia     Myeloma [facility])        Chief Complaint for this Admission:  SOB     Allergies:  Percocet [oxycodone-acetaminophen]    Prior to Admission Medications:   Prior to Admission Medications   Prescriptions Last Dose Informant Patient Reported? Taking? ASPIRIN CHILD PO 2017 at Unknown time  Yes Yes   Sig: Take 81 mg by mouth daily. DOCUSATE CALCIUM (STOOL SOFTENER PO) 2017 at Unknown time  Yes Yes   Sig: Take 2 Caps by mouth two (2) times a day. HYDROmorphone (DILAUDID) 2 mg tablet   Yes Yes   Sig: Take 2 mg by mouth every four (4) hours as needed for Pain. acyclovir (ZOVIRAX) 400 mg tablet 2017 at Unknown time  Yes Yes   Sig: Take 400 mg by mouth two (2) times a day. calcium-cholecalciferol, d3, (CALCIUM 600 + D) 600-125 mg-unit tab 2017 at Unknown time  Yes Yes   Sig: Take 2 Tabs by mouth two (2) times a day. esomeprazole (NEXIUM) 40 mg capsule 2017 at Unknown time  Yes Yes   Sig: Take 40 mg by mouth daily. fentaNYL (DURAGESIC) 100 mcg/hr PATCH 2017 at Unknown time  Yes Yes   Si Patch by TransDERmal route every fourty-eight (48) hours. loratadine (CLARITIN) 10 mg tablet 2017 at Unknown time  Yes Yes   Sig: Take 10 mg by mouth daily. senna (SENNA) 8.6 mg tablet   Yes Yes   Sig: Take 1 Tab by mouth daily. tamsulosin (FLOMAX) 0.4 mg capsule   Yes Yes   Sig: Take 0.4 mg by mouth nightly. Facility-Administered Medications: None         Comments/Recommendations: Reviewed medications with patient and spouse. Removed: Alprazolam, Dexamethasone, Centrum Silver, Ranitidine, Ondansetron    Add: Claritin, Tamsulosin, Senna, Calcium    Also takes MM drug believed to be Pomalyst, he takes 1 daily for 21 days. He is currently on cycle and is scheduled to end on Tuesday.   Please review with patient to clarify correct medication.     Lucy Rosales, PharmD

## 2017-08-25 NOTE — CDMP QUERY
Dear Hospitalists,    Please clarify if this patient is being treated/managed for:    =>Sepsis (POA) in the setting of fever with hx multiple myeloma requiring IVF bolus, lab monitoring, rocephin/zithromax  =>Other Explanation of clinical findings  =>Unable to Determine (no explanation of clinical findings)    The medical record reflects the following clinical findings, treatment, and risk factors:    Risk Factors: 63yo cc Fever, cough, nausea/ vomiting  Clinical Indicators: temp 101.6, wbc 2.9, ,  hx immunocompromised/chemo  Treatment: bolus and continuous IVF, lab monitoring, rocephin/zithromax    Please clarify and document your clinical opinion in the progress notes and discharge summary including the definitive and/or presumptive diagnosis, (suspected or probable), related to the above clinical findings. Please include clinical findings supporting your diagnosis.     Thank You  Chi Olson,MSN,BSN,RN,[facility]  [phone]

## 2017-08-25 NOTE — PROGRESS NOTES
Chart Reviewed:  CM met with patient and patient's spouse, Karen Quinn [phone]. Patient resting. CM completed assessment with patient's spouse. CM verified patient's insurance coverage and demographics. CM informed that patient's PCP left practice at the end of July. Currently patient and spouse are awaiting to arrange an appointment with a new PCP within the practice. Patient is a 58year old male with an admitting diagnosis of fever. Patient has a medical history of multiple myeloma on chemotherapy, s/p stem cell transplant, and hyperlipidemia. Patient resides in a 1 story home with his spouse with 2 steps to enter. Patient is independent with ADL's and IADL's. DME in the home consists of a trapeze to assist patient with getting out of the bed. Patient is currently employed and patient and spouse report no financial stressors or concerns at this time. Pharmacy utilized for prescriptions is [facility] in [place], [place]. Mode of transport at time of discharge to be provided by patient's spouse. Patient will need assistance with scheduling a PCP appointment at discharge. There are no CM consults at this time. CM will continue to follow and assist with disposition needs as they arise. Care Management Interventions  PCP Verified by CM: Yes  Palliative Care Consult (Criteria: CHF and RRAT>21): No  Reason for No Palliative Care Consult:  Other (see comment) (Does not meet criteria at this time)  Mode of Transport at Discharge: BLS (Spouse to transport at discharge)  Transition of Care Consult (CM Consult):  (There are no CM consults or needs at this time)  Discharge Durable Medical Equipment: No  Physical Therapy Consult: No  Occupational Therapy Consult: No  Speech Therapy Consult: No  Current Support Network: Lives with Spouse, Own Home  Confirm Follow Up Transport: Family  Plan discussed with Pt/Family/Caregiver: Yes  Freedom of Choice Offered: Yes  Discharge Location  Discharge Placement: Home with family assistance    KIM Wilson/CONNIE  2:35 PM

## 2017-08-25 NOTE — PROGRESS NOTES
Spiritual Care Partner Volunteer visited patient in [address] on 8/25/17. Documented by:  Pedro Luis Pandey M.Div.    Paging Service [phone])

## 2017-08-25 NOTE — PROGRESS NOTES
Problem: Discharge Planning  Goal: *Discharge to safe environment  Outcome: Progressing Towards Goal  Disposition Needs: Patient plans to discharge home with family assistance. Patient will need assistance scheduling a PCP appointment at prior doctor's office. There are no CM consults. CM will continue to follow and assist with disposition needs as they arise. Mode of transport at time of discharge to be provided by patient's spouse.      KIM Wooten/CONNIE  2:38 PM

## 2017-08-25 NOTE — H&P
History & Physical    Date of admission: 8/24/2017    Patient name: Roz Montalvo  MRN: [id number]  YOB: 1955  Age: 58 y.o. Primary care provider:  Dena Davis MD     Source of Information: patient, patient's wife, ED and medical records                             Chief complaint:  Fever, ccough, nausea/ vomiting/ diarhrea,  \"would name it\". History of present illness  Roz Montalvo is a 58 y.o. white male with past medical history of multiple myeloma on chemotherapy, s/p stem cell transplant, and hyperlipidemia presented to the ER from home accompanied by his wife with chief complaints of fever, cough, nausea/ vomiting/ diarhrea,  \"would name it\". Patient reports onset of symptoms starting ~ 6 days ago with recurrent bouts of nausea, vomiting with non-bloody, non-bilious emesis, diarrhea with loose, watery, non-bloody stools, generalized abdominal pain, left anterior chest pain, mild shortness of breath, cough, decreased appetite, decreased urine output with dark urine. Cough notedly has been persistent, productive of yellowish sputum. Patient has multiple myeloma with last chemotherapy last week. He is followed by oncologist Dr. Jia Frausto. On arrival in the ED, initial recorded vital signs were BP= 121/74, HR= 105, RR= 18, O2sat= 94%. Chest xray PA/lateral showed no acute process. Temperature increased to maximum of 101.6 F. Paired blood cultures were sent. Patient was started empirically on IV antibiotics with Rocephin 2 grams IV, Zithromax 500 mg IV. He was given 0.9% normal saline 1000 ml IV fluid bolus, Zofran 4 mg IV, KCl 40 mEq po x 1, Albuterol 2.5 mg nebulizer treatment, and Motrin 600 mg po. Patient is now seen for admission to the hospitalist service for continued evaluation and treatments.   There were no reports of new onset syncope, loss of consciousness, headache, neck pain, visual disturbance, numbness, paresthesias, focal weakness,  palpitations, melena, hematuria, calf pain, increased leg swelling/ edema, or rash. Past Medical History:   Diagnosis Date    Hypercholesteremia     Myeloma ([facility] [address]       Past Surgical History:   Procedure Laterality Date    CARDIAC SURG PROCEDURE UNLIST      EP study    HX CARPAL TUNNEL RELEASE Right     HX HEENT  2006    lasik eye surgery-bilateral    HX MENISCUS REPAIR       Prior to Admission medications    Medication Sig Start Date End Date Taking? Authorizing Provider   esomeprazole (NEXIUM) 40 mg capsule Take 40 mg by mouth daily. Yes Historical Provider   loratadine (CLARITIN) 10 mg tablet Take 10 mg by mouth daily. Yes Historical Provider   tamsulosin (FLOMAX) 0.4 mg capsule Take 0.4 mg by mouth nightly. Yes Historical Provider   senna (SENNA) 8.6 mg tablet Take 1 Tab by mouth daily. Yes Historical Provider   calcium-cholecalciferol, d3, (CALCIUM 600 + D) 600-125 mg-unit tab Take 2 Tabs by mouth two (2) times a day. Yes Historical Provider   fentaNYL (DURAGESIC) 100 mcg/hr PATCH 1 Patch by TransDERmal route every fourty-eight (48) hours. Yes Historical Provider   DOCUSATE CALCIUM (STOOL SOFTENER PO) Take 2 Caps by mouth two (2) times a day. Yes Historical Provider   ASPIRIN CHILD PO Take 81 mg by mouth daily. Yes Historical Provider   acyclovir (ZOVIRAX) 400 mg tablet Take 400 mg by mouth two (2) times a day. Yes Historical Provider   HYDROmorphone (DILAUDID) 2 mg tablet Take 2 mg by mouth every four (4) hours as needed for Pain.    Yes Historical Provider     Allergies   Allergen Reactions    Percocet [Oxycodone-Acetaminophen] Rash      Social history  Patient resides  x  Independently /                Ambulates  x  Independently                 Alcohol history   x  None           Smoking history  x  None             History   Smoking Status    Never Smoker   Smokeless Tobacco    Never Used     Illegal drugs:  None reported    Code status  x  Full code     Review of systems  I performed a ten systems review; pertinent positives were as noted in HPI, otherwise negative. Physical Examination   Visit Vitals    /67    Pulse (!) 105    Temp (!) 101.6 °F (38.7 °C)    Resp 18    Ht 5' 11\" (1.803 m)    Wt 70.9 kg (156 lb 3.2 oz)    SpO2 96%    BMI 21.79 kg/m2      O2 Flow Rate (L/min): 2 l/min   O2 Device: Nasal cannula    General:  Patient in mild respiratory distress with intermittent/ persistent coughing episodes   Head:  Normocephalic, without obvious abnormality, atraumatic   Eyes:  Conjunctivae/corneas clear. PERRL, EOMs intact   E/N/M/T: Nares normal. Septum midline. No nasal drainage or sinus tenderness  Tongue midline/ non-edematous  Clear oropharynx   Neck: Normal appearance and movements, symmetrical, trachea midline  No palpable adenopathy  No thyroid enlargement, tenderness or nodules  No carotid bruit   Normal JVD   Lungs:   Symmetrical chest expansion and respiratory effort  rales to auscultation bilaterally   Chest wall:  No tenderness or deformity   Heart:  Regular rate and rhythm   Sounds normal; no murmur, click, rub or gallop   Abdomen:   Soft, mild generalized tenderness  No rebound, guarding, or rigidity  Non-distended  Bowel sounds normal  No masses or hepatosplenomegaly  No hernias present   Back: No CVA tenderness   Extremities: Extremities normal, atraumatic  No cyanosis, clubbing,  or edema  No DVT signs   Pulses 2+ radial/ 1+ DP bilateral pulses   Skin: No rashes or ulcers  Warm/ dry   Musculo-      skeletal: Gait not tested  Normal symmetry, ROM, strength and tone  No calf tenderness   Neuro: GCS 15. Moves all extremities x 4. No slurred speech. No facial droop. Sensation grossly intact.      Psych: Alert, oriented x 3           Data Review    24 Hour Results:  Recent Results (from the past 24 hour(s))   CBC WITH AUTOMATED DIFF    Collection Time: 08/24/17  9:56 PM   Result Value Ref Range    WBC 2.9 (L) 4.1 - 11.1 K/uL    RBC 3.92 (L) 4.10 - 5.70 M/uL    HGB 11.4 (L) 12.1 - 17.0 g/dL    HCT 34.0 (L) 36.6 - 50.3 %    MCV 86.7 80.0 - 99.0 FL    MCH 29.1 26.0 - 34.0 PG    MCHC 33.5 30.0 - 36.5 g/dL    RDW 14.9 (H) 11.5 - 14.5 %    PLATELET 816 882 - 278 K/uL    NEUTROPHILS 67 32 - 75 %    BAND NEUTROPHILS 2 0 - 6 %    LYMPHOCYTES 17 12 - 49 %    MONOCYTES 11 5 - 13 %    EOSINOPHILS 2 0 - 7 %    BASOPHILS 1 0 - 1 %    ABS. NEUTROPHILS 2.0 1.8 - 8.0 K/UL    ABS. LYMPHOCYTES 0.5 (L) 0.8 - 3.5 K/UL    ABS. MONOCYTES 0.3 0.0 - 1.0 K/UL    ABS. EOSINOPHILS 0.1 0.0 - 0.4 K/UL    ABS. BASOPHILS 0.0 0.0 - 0.1 K/UL    DF MANUAL      RBC COMMENTS TEARDROP CELLS  PRESENT        WBC COMMENTS REACTIVE LYMPHS     METABOLIC PANEL, COMPREHENSIVE    Collection Time: 08/24/17  9:56 PM   Result Value Ref Range    Sodium 136 136 - 145 mmol/L    Potassium 2.6 (LL) 3.5 - 5.1 mmol/L    Chloride 100 97 - 108 mmol/L    CO2 30 21 - 32 mmol/L    Anion gap 6 5 - 15 mmol/L    Glucose 105 (H) 65 - 100 mg/dL    BUN 23 (H) 6 - 20 MG/DL    Creatinine 0.82 0.70 - 1.30 MG/DL    BUN/Creatinine ratio 28 (H) 12 - 20      GFR est AA >60 >60 ml/min/1.73m2    GFR est non-AA >60 >60 ml/min/1.73m2    Calcium 8.0 (L) 8.5 - 10.1 MG/DL    Bilirubin, total 0.4 0.2 - 1.0 MG/DL    ALT (SGPT) 52 12 - 78 U/L    AST (SGOT) 62 (H) 15 - 37 U/L    Alk.  phosphatase 108 45 - 117 U/L    Protein, total 8.2 6.4 - 8.2 g/dL    Albumin 2.5 (L) 3.5 - 5.0 g/dL    Globulin 5.7 (H) 2.0 - 4.0 g/dL    A-G Ratio 0.4 (L) 1.1 - 2.2     LACTIC ACID    Collection Time: 08/24/17 10:46 PM   Result Value Ref Range    Lactic acid 1.0 0.4 - 2.0 MMOL/L   URINALYSIS W/ RFLX MICROSCOPIC    Collection Time: 08/24/17 11:04 PM   Result Value Ref Range    Color DARK YELLOW      Appearance CLEAR CLEAR      Specific gravity 1.030 1.003 - 1.030      pH (UA) 5.5 5.0 - 8.0      Protein 300 (A) NEG mg/dL    Glucose NEGATIVE  NEG mg/dL    Ketone TRACE (A) NEG mg/dL    Blood LARGE (A) NEG Urobilinogen 1.0 0.2 - 1.0 EU/dL    Nitrites NEGATIVE  NEG      Leukocyte Esterase NEGATIVE  NEG      WBC 0-4 0 - 4 /hpf    RBC 5-10 0 - 5 /hpf    Epithelial cells FEW FEW /lpf    Bacteria NEGATIVE  NEG /hpf    Mucus 2+ (A) NEG /lpf    Hyaline cast 5-10 0 - 5 /lpf    Granular cast 0-2 (A) NEG /lpf   BILIRUBIN, CONFIRM    Collection Time: 08/24/17 11:04 PM   Result Value Ref Range    Bilirubin UA, confirm NEGATIVE  NEG       Recent Labs      08/24/17   2156   WBC  2.9*   HGB  11.4*   HCT  34.0*   PLT  303     Recent Labs      08/24/17   2156   NA  136   K  2.6*   CL  100   CO2  30   GLU  105*   BUN  23*   CREA  0.82   CA  8.0*   ALB  2.5*   TBILI  0.4   SGOT  62*   ALT  52       Imaging  Chest xray portable:  COMPARISON: 3/31/2016     TECHNIQUE: PA and lateral chest views     FINDINGS: A right chest Port-A-Cath terminates in profile with the SVC. The  cardiomediastinal contours are stable. The pulmonary vasculature is within  normal limits.      The lungs and pleural spaces are clear. There is no pneumothorax. The bones and  upper abdomen are stable. IMPRESSION:     No acute process. Stable exam.      Assessment and Plan   1. Fever. Etiology unknown. Check blood cultures. Continue empiric IV antibiotics considering that patient is immunocomprised with  current leukopenia and history of prior stem cell transplant on chemotherapy. May have Tylenol prn.      2.  Dyspnea . Ordered stat CTA chest rule out acute process. May have supplemental O2 prn. Place on pulse oximetry monitoring. 3.  Tachycardia. Likely secondary to recent fever, pain. 4.  Chest pain. Ordered 12 lead EKG    5. Acute hypokalemia. Repeat K level post replacement. 6.  Elevated LFTs. Repeat CMP. 7.  Cough. Supportive cares. Order Tessalon perles. 8.  Multiple myeloma. On chemotherapy. Consult oncologist.    9.  Sore throat. Order strep test.    10.  Generalized abdominal pain. Ordered CT abdomen/ pelvis.   Keep NPO pending CT. 11.  Diarrhea. Order stool occult blood test, c diff. 12.  Nausea/ vomiting. Zofran 4 mg IV q 6 hours prn. 13.  Oliguria. Continue IV fluid hydration. Ordered 0.9% NS 1000 ml IV fluid bolus now and then 125 mg/hr. In addition to prior 1000 ml IVF bolus already given, total boluses would approximate (30 ml/kg x 70.9 kg = 2,127 ml) IVFs needed for sepsis protocol. Place on strict Is/Os. Repeat renal panel. 14. VTE prophylaxis. SCDs to BLEs.              Signed by: Leticia Licea MD    August 25, 2017 at 1:57 AM

## 2017-08-25 NOTE — PROGRESS NOTES
Primary Nurse Allen Diaz RN and Gill Nuñez RN performed a dual skin assessment on this patient No impairment noted  Luis score is 21

## 2017-08-25 NOTE — ED PROVIDER NOTES
HPI Comments: 58 y.o. male with past medical history significant for Multiple myeloma, Hyperchoelsteremia who presents from urgent care accompanied by spouse with chief complaint of cough. Pt reports 5 day hx of gradually worsening productive cough. Pt notes that he has been expelling \"thick yellow\" sputum. Pt also c/o mild SOB, sore throat, fever, fatigue, decreased appetite, nausea, vomiting and decreased urine. He has been taking Robitussin without relief. Pt presented to an urgent care near his home this evening where provider was concerned for dehydration and possible pneumonia but was unable to perform a chest XR therefore advised to come to ED. While in ED, pt notes hx of asthma as a child. He has a hx of multiple myeloma and is followed by Dr. Dorothy Franco. Pt denies any other acute medical concerns at this time. PCP: Anil Dexter MD  Oncology: Dorothy Franco MD    Note written by Delgado Shelton, as dictated by Lee Ann Gonzalez MD 11:16 PM    The history is provided by the patient. No  was used. Past Medical History:   Diagnosis Date    Hypercholesteremia     Myeloma ([address]        Past Surgical History:   Procedure Laterality Date    CARDIAC SURG PROCEDURE UNLIST      EP study    HX CARPAL TUNNEL RELEASE Right     HX HEENT  2006    lasik eye surgery-bilateral    HX MENISCUS REPAIR           History reviewed. No pertinent family history. Social History     Social History    Marital status:      Spouse name: N/A    Number of children: N/A    Years of education: N/A     Occupational History    Not on file.      Social History Main Topics    Smoking status: Never Smoker    Smokeless tobacco: Never Used    Alcohol use No    Drug use: No    Sexual activity: Not on file     Other Topics Concern    Not on file     Social History Narrative         ALLERGIES: Percocet [oxycodone-acetaminophen]    Review of Systems   Constitutional: Positive for appetite change (decreased), fatigue and fever. Negative for chills. HENT: Positive for sore throat. Negative for congestion and nosebleeds. Eyes: Negative for pain and discharge. Respiratory: Positive for cough (productive) and shortness of breath. Cardiovascular: Negative for chest pain and palpitations. Gastrointestinal: Positive for nausea and vomiting. Negative for abdominal pain and constipation. Genitourinary: Positive for decreased urine volume. Negative for dysuria, flank pain and urgency. Musculoskeletal: Negative for gait problem and myalgias. Skin: Negative for rash and wound. Neurological: Negative for seizures and syncope. Hematological: Does not bruise/bleed easily. Psychiatric/Behavioral: Negative for confusion, self-injury and suicidal ideas. Vitals:    08/24/17 2254 08/24/17 2330 08/25/17 0000 08/25/17 0006   BP:  141/77 108/64    Pulse:       Resp:       Temp: (!) 101.6 °F (38.7 °C)      SpO2:  91% 93% 96%   Weight:       Height:                Physical Exam   Constitutional: He is oriented to person, place, and time. He appears well-developed and well-nourished. HENT:   Head: Normocephalic and atraumatic. Mouth/Throat: Mucous membranes are dry. Eyes: EOM are normal. Pupils are equal, round, and reactive to light. Neck: Normal range of motion. Neck supple. Cardiovascular: Normal rate, regular rhythm, normal heart sounds and intact distal pulses. Pulmonary/Chest: Effort normal. No respiratory distress. He has no wheezes. He has rhonchi in the left lower field. He has rales in the left lower field. Abdominal: Soft. Bowel sounds are normal. There is no tenderness. There is no rebound and no guarding. Musculoskeletal: Normal range of motion. Neurological: He is alert and oriented to person, place, and time. Skin: Skin is warm and dry. Psychiatric: He has a normal mood and affect. His behavior is normal.   Nursing note and vitals reviewed.   Note written by Delgado Middleton, as dictated by Lance Yousif MD 12:26 AM    MDM  Number of Diagnoses or Management Options  Community acquired pneumonia:   Dehydration:   Hypokalemia:   Diagnosis management comments: 80-year-old male with past no history seen here for multiple myeloma status post bone marrow transplant presents with complaints of 5 days productive cough with fevers and shortness of breath. Patient appears dehydrated, left lower lobe rhonchi/rales, febrile, hemodynamically stable, nontoxic. Plan-fever control, cardiac monitor, IV fluid hydration, chest x-ray, nebulizer therapy, CBC/CMP/lactate, IV antibiotics, blood cultures, admit. Chest x-ray unremarkable  Labs remarkable for WBC 2.9 with 2% bands, potassium 2.6       Amount and/or Complexity of Data Reviewed  Clinical lab tests: ordered and reviewed  Tests in the radiology section of CPT®: ordered and reviewed  Obtain history from someone other than the patient: yes  Discuss the patient with other providers: yes  Independent visualization of images, tracings, or specimens: yes    Risk of Complications, Morbidity, and/or Mortality  Presenting problems: high  Diagnostic procedures: high  Management options: high    Critical Care  Total time providing critical care: 30-74 minutes    Patient Progress  Patient progress: improved    ED Course     CONSULT NOTE:  11:44 PM Lance Yousif MD spoke with Dr. Ayan Del Cid, Consult for Hospitalist.  Discussed available diagnostic tests and clinical findings. Dr. Ayan Del Cid will see and admit. CONSULT NOTE:  11:51 PM Lance Yousif MD spoke with Dr. Lu Cerna, Consult for Oncology. Discussed available diagnostic tests and clinical findings. Dr. Lu Cerna agreeable with plan for admission and he or Dr. Xi Benitez will see patient during admission. PROGRESS NOTE:  12:04 AM  Pt states that his cough has mildly improved after dose of Robitussin.  He states that he feels weak and does not have the energy to expell the sputum he wants to.      Procedures

## 2017-08-26 LAB
ALBUMIN SERPL-MCNC: 1.9 G/DL (ref 3.5–5)
ALBUMIN/GLOB SERPL: 0.4 {RATIO} (ref 1.1–2.2)
ALP SERPL-CCNC: 68 U/L (ref 45–117)
ALT SERPL-CCNC: 29 U/L (ref 12–78)
ANION GAP SERPL CALC-SCNC: 8 MMOL/L (ref 5–15)
AST SERPL-CCNC: 21 U/L (ref 15–37)
BASOPHILS # BLD: 0 K/UL (ref 0–0.1)
BASOPHILS NFR BLD: 0 % (ref 0–1)
BILIRUB SERPL-MCNC: 0.2 MG/DL (ref 0.2–1)
BUN SERPL-MCNC: 14 MG/DL (ref 6–20)
BUN/CREAT SERPL: 27 (ref 12–20)
CALCIUM SERPL-MCNC: 7 MG/DL (ref 8.5–10.1)
CHLORIDE SERPL-SCNC: 107 MMOL/L (ref 97–108)
CO2 SERPL-SCNC: 25 MMOL/L (ref 21–32)
CREAT SERPL-MCNC: 0.52 MG/DL (ref 0.7–1.3)
DIFFERENTIAL METHOD BLD: ABNORMAL
EOSINOPHIL # BLD: 0.1 K/UL (ref 0–0.4)
EOSINOPHIL NFR BLD: 5 % (ref 0–7)
ERYTHROCYTE [DISTWIDTH] IN BLOOD BY AUTOMATED COUNT: 15.2 % (ref 11.5–14.5)
GLOBULIN SER CALC-MCNC: 4.4 G/DL (ref 2–4)
GLUCOSE SERPL-MCNC: 79 MG/DL (ref 65–100)
HCT VFR BLD AUTO: 27.5 % (ref 36.6–50.3)
HGB BLD-MCNC: 8.9 G/DL (ref 12.1–17)
LYMPHOCYTES # BLD: 0.4 K/UL (ref 0.8–3.5)
LYMPHOCYTES NFR BLD: 16 % (ref 12–49)
MCH RBC QN AUTO: 28.6 PG (ref 26–34)
MCHC RBC AUTO-ENTMCNC: 32.4 G/DL (ref 30–36.5)
MCV RBC AUTO: 88.4 FL (ref 80–99)
MONOCYTES # BLD: 0.2 K/UL (ref 0–1)
MONOCYTES NFR BLD: 9 % (ref 5–13)
NEUTS BAND NFR BLD MANUAL: 4 % (ref 0–6)
NEUTS SEG # BLD: 1.7 K/UL (ref 1.8–8)
NEUTS SEG NFR BLD: 66 % (ref 32–75)
PLATELET # BLD AUTO: 249 K/UL (ref 150–400)
POTASSIUM SERPL-SCNC: 3.6 MMOL/L (ref 3.5–5.1)
PROT SERPL-MCNC: 6.3 G/DL (ref 6.4–8.2)
RBC # BLD AUTO: 3.11 M/UL (ref 4.1–5.7)
RBC MORPH BLD: ABNORMAL
RBC MORPH BLD: ABNORMAL
SODIUM SERPL-SCNC: 140 MMOL/L (ref 136–145)
WBC # BLD AUTO: 2.4 K/UL (ref 4.1–11.1)
WBC MORPH BLD: ABNORMAL

## 2017-08-26 PROCEDURE — 85025 COMPLETE CBC W/AUTO DIFF WBC: CPT | Performed by: FAMILY MEDICINE

## 2017-08-26 PROCEDURE — 77010033678 HC OXYGEN DAILY

## 2017-08-26 PROCEDURE — 65270000029 HC RM PRIVATE

## 2017-08-26 PROCEDURE — 80053 COMPREHEN METABOLIC PANEL: CPT | Performed by: INTERNAL MEDICINE

## 2017-08-26 PROCEDURE — 51700 IRRIGATION OF BLADDER: CPT

## 2017-08-26 PROCEDURE — 36415 COLL VENOUS BLD VENIPUNCTURE: CPT | Performed by: FAMILY MEDICINE

## 2017-08-26 PROCEDURE — 3E1K78Z IRRIGATION OF GENITOURINARY TRACT USING IRRIGATING SUBSTANCE, VIA NATURAL OR ARTIFICIAL OPENING: ICD-10-PCS | Performed by: INTERNAL MEDICINE

## 2017-08-26 PROCEDURE — 94640 AIRWAY INHALATION TREATMENT: CPT

## 2017-08-26 PROCEDURE — 74011250636 HC RX REV CODE- 250/636: Performed by: NURSE PRACTITIONER

## 2017-08-26 PROCEDURE — 74011000258 HC RX REV CODE- 258: Performed by: FAMILY MEDICINE

## 2017-08-26 PROCEDURE — 74011250636 HC RX REV CODE- 250/636: Performed by: FAMILY MEDICINE

## 2017-08-26 PROCEDURE — 77030029684 HC NEB SM VOL KT MONA -A

## 2017-08-26 PROCEDURE — 74011250637 HC RX REV CODE- 250/637: Performed by: NURSE PRACTITIONER

## 2017-08-26 PROCEDURE — 83883 ASSAY NEPHELOMETRY NOT SPEC: CPT | Performed by: INTERNAL MEDICINE

## 2017-08-26 PROCEDURE — 84165 PROTEIN E-PHORESIS SERUM: CPT | Performed by: INTERNAL MEDICINE

## 2017-08-26 PROCEDURE — 74011000250 HC RX REV CODE- 250: Performed by: FAMILY MEDICINE

## 2017-08-26 RX ORDER — ACETAMINOPHEN 325 MG/1
650 TABLET ORAL
Status: DISCONTINUED | OUTPATIENT
Start: 2017-08-26 | End: 2017-08-28 | Stop reason: HOSPADM

## 2017-08-26 RX ORDER — AMOXICILLIN 250 MG
1 CAPSULE ORAL DAILY
Status: DISCONTINUED | OUTPATIENT
Start: 2017-08-26 | End: 2017-08-28 | Stop reason: HOSPADM

## 2017-08-26 RX ORDER — GUAIFENESIN 600 MG/1
1200 TABLET, EXTENDED RELEASE ORAL 2 TIMES DAILY
Status: DISCONTINUED | OUTPATIENT
Start: 2017-08-26 | End: 2017-08-28 | Stop reason: HOSPADM

## 2017-08-26 RX ORDER — ACYCLOVIR 800 MG/1
400 TABLET ORAL 2 TIMES DAILY
Status: DISCONTINUED | OUTPATIENT
Start: 2017-08-26 | End: 2017-08-28 | Stop reason: HOSPADM

## 2017-08-26 RX ORDER — DEXTROSE, SODIUM CHLORIDE, AND POTASSIUM CHLORIDE 5; .45; .15 G/100ML; G/100ML; G/100ML
75 INJECTION INTRAVENOUS CONTINUOUS
Status: DISCONTINUED | OUTPATIENT
Start: 2017-08-26 | End: 2017-08-28 | Stop reason: HOSPADM

## 2017-08-26 RX ORDER — HYDROMORPHONE HYDROCHLORIDE 2 MG/1
2 TABLET ORAL
Status: DISCONTINUED | OUTPATIENT
Start: 2017-08-26 | End: 2017-08-28 | Stop reason: HOSPADM

## 2017-08-26 RX ADMIN — ACYCLOVIR 400 MG: 800 TABLET ORAL at 17:03

## 2017-08-26 RX ADMIN — Medication 10 ML: at 13:35

## 2017-08-26 RX ADMIN — GUAIFENESIN 1200 MG: 600 TABLET, EXTENDED RELEASE ORAL at 10:00

## 2017-08-26 RX ADMIN — Medication 10 ML: at 06:46

## 2017-08-26 RX ADMIN — CEFTRIAXONE 1 G: 1 INJECTION, POWDER, FOR SOLUTION INTRAMUSCULAR; INTRAVENOUS at 23:18

## 2017-08-26 RX ADMIN — DOCUSATE SODIUM AND SENNOSIDES 1 TABLET: 8.6; 5 TABLET, FILM COATED ORAL at 09:03

## 2017-08-26 RX ADMIN — GUAIFENESIN 1200 MG: 600 TABLET, EXTENDED RELEASE ORAL at 17:04

## 2017-08-26 RX ADMIN — SODIUM CHLORIDE 100 ML/HR: 900 INJECTION, SOLUTION INTRAVENOUS at 04:36

## 2017-08-26 RX ADMIN — DEXTROSE MONOHYDRATE, SODIUM CHLORIDE, AND POTASSIUM CHLORIDE 75 ML/HR: 50; 4.5; 1.49 INJECTION, SOLUTION INTRAVENOUS at 23:18

## 2017-08-26 RX ADMIN — ACETAMINOPHEN 650 MG: 325 TABLET, FILM COATED ORAL at 15:05

## 2017-08-26 RX ADMIN — CITALOPRAM HYDROBROMIDE 40 MG: 20 TABLET ORAL at 06:45

## 2017-08-26 RX ADMIN — HYDROMORPHONE HYDROCHLORIDE 2 MG: 2 TABLET ORAL at 09:15

## 2017-08-26 RX ADMIN — ACYCLOVIR 400 MG: 800 TABLET ORAL at 09:02

## 2017-08-26 RX ADMIN — DEXTROSE MONOHYDRATE, SODIUM CHLORIDE, AND POTASSIUM CHLORIDE 75 ML/HR: 50; 4.5; 1.49 INJECTION, SOLUTION INTRAVENOUS at 09:15

## 2017-08-26 RX ADMIN — TAMSULOSIN HYDROCHLORIDE 0.4 MG: 0.4 CAPSULE ORAL at 23:17

## 2017-08-26 RX ADMIN — IPRATROPIUM BROMIDE AND ALBUTEROL SULFATE 3 ML: .5; 3 SOLUTION RESPIRATORY (INHALATION) at 10:25

## 2017-08-26 NOTE — PROGRESS NOTES
Patient seen and examined independently. Still with a productive cough, chest congestion, though feels better overall. On exam he is tired, but non-toxic, +b/l rhonchi. Parainfluenza 4 virus PCR +. I agree with the NP's A&P. Continue supportive care. Stop abx tomorrow if cultures remain negative.

## 2017-08-26 NOTE — PROGRESS NOTES
Informed Janeth Simon NP, of patient's temp (100.8F). Per NP, she will place orders from Tylenol. Will administer per orders.

## 2017-08-26 NOTE — PROGRESS NOTES
Bedside shift change report given to [facility] KIMMIE (oncoming nurse) by Sherita Nelson (offgoing nurse). Report included the following information SBAR.

## 2017-08-26 NOTE — PROGRESS NOTES
Bedside and Verbal shift change report given to Violet Livingston RN (oncoming nurse) by Lucina Avila RN (offgoing nurse). Report included the following information SBAR, Kardex, Intake/Output, MAR and Recent Results.

## 2017-08-26 NOTE — PROGRESS NOTES
Bedside shift change report given to nestor sosa (oncoming nurse) by John Noel (offgoing nurse). Report included the following information SBAR, Kardex, Intake/Output, MAR and Recent Results.

## 2017-08-26 NOTE — PROGRESS NOTES
[facility]eme/Onc; [facility]    [facility] physician: Robin Wahl    EMR reviewed  In with URI. D/w nursing staff  About the same today compared to yesterday  Wife states that he is less alert today (slightly).     Comprehensive ROS performed and o/w negative    99.1; 89; 156/84; 93% on 4L  NAD  Regular  No distress; on supplemental O2  abd soft; NT; ND      2.4<8.9>249  from 8/24/17  Cr 0.52    A/P  1) Myeloma  -plan per Dr. Robin Wahl  -pomalidomide maintenance on hold at present  -can hold Ca/Vit D as well while inpatient    2) URI  -on ABX (reviewed  -increase risk of infection overall due to underlying myeloma and maintenance treatment    3) Weight loss, FTT  -due to URI  -expect improvement as he recovers from infection    Will continue to follow

## 2017-08-26 NOTE — PROGRESS NOTES
Hospitalist Progress Note  Hemal Vo NP  Office: [phone]  Cell: [phone]      Date of Service:  2017  NAME:  Terrell Suarez  :  1955  MRN:  [id number]      Admission Summary:   Mr. Michelle Edmondson is a 59 yo male with PMH significant for multiple myeloma and s/p stem cell transplant x 2. Presented to ED on  with fever, fatigue, feeling unwell, nausea/vomiting. Actively on chemotherapy followed by Dr. Koffi Pastrana. Admitted for sepsis treatment and work up. Interval history / Subjective:   Feels exhausted. Coughing up some green, thick sputum and feels as though it is slowly loosening up. Wants to rest. Discussed results of testing with him and plan to remain hospitalized through the weekend. Still has no appetite and feels very weak.       Assessment & Plan:     Sepsis (POA) related to CAP   - Fluid resuscitated and will continue IVF but decrease rate slightly  - Continue Azithro and Rocephin for PNA pending blood cultures but likely will stop tomorrow if no growth and will treat as virus  - Blood cultures pending  - UA negative  - Lactate ok    Parainfluenza 4 (POA), likely exposed by family  - CT chest with possible infectious etiology and B/L tree-in-budd opacities  - Unfortunately, supportive care is really all that is recommended   - Droplet precautions  - Wean O2 as able  - Start Mucinex    Multiple myeloma  - Followed by Dr. Koffi Pastrana who is following  - Chemo will be on hold until infection ruled out    Hypokalemia (POA)  - K+ 2.6 on admission and now 3.8 on recheck    Dehydration  - Renal function ok on arrival but minimal UO suspect due to poor PO intake and sepsis  - Responded well to IVF and UO much better  - Decrease IVF    Generalized abdominal pain  - Resolved and described more as nauseated  - No acute process on CT A/P and only 1 episode of diarrhea on Wednesday  - D/C c diff testing as no stool     Code status: Full  DVT prophylaxis: Lovenox    Care Plan discussed with: Patient, Wife, RN, Attending on 8/26  Disposition: Home when medically improved     Hospital Problems  Date Reviewed: 8/25/2017          Codes Class Noted POA    Acute hypokalemia ICD-10-CM: E87.6  ICD-9-CM: 276.8  8/25/2017 Unknown        Tachycardia ICD-10-CM: R00.0  ICD-9-CM: 785.0  8/25/2017 Unknown        * (Principal)Fever ICD-10-CM: R50.9  ICD-9-CM: 780.60  8/25/2017 Unknown                Review of Systems:   Denied CP, + non-productive cough when he talks, SOB not an issue when laying down, + nausea, no stool       Vital Signs:    Last 24hrs VS reviewed since prior progress note. Most recent are:  Visit Vitals    /84 (BP 1 Location: Left arm, BP Patient Position: At rest)    Pulse 89    Temp 99.1 °F (37.3 °C)    Resp 18    Ht 5' 11\" (1.803 m)    Wt 70.9 kg (156 lb 3.2 oz)    SpO2 94%    BMI 21.79 kg/m2         Intake/Output Summary (Last 24 hours) at 08/26/17 1026  Last data filed at 08/26/17 0435   Gross per 24 hour   Intake             2379 ml   Output             1050 ml   Net             1329 ml        Physical Examination:             Constitutional:  No acute distress, cooperative, pleasant but looks unwell and ashen   ENT:  Oral mucous moist, oropharynx benign. Neck supple   Resp:  Scattered rhonchi worse to RUL but decent aeration. No accessory muscle use   CV:  Regular rhythm, normal rate, no murmurs, gallops, rubs    GI:  Soft, non distended, non tender. normoactive bowel sounds, no hepatosplenomegaly     Musculoskeletal:  No edema, warm, 2+ pulses throughout. WATSON with 5/5 strength    Neurologic:  Moves all extremities. AAOx3, CN II-XII reviewed and exam is non-focal     Psych:  Good insight, Not anxious nor agitated. Skin:  Good turgor, no rashes or ulcers  Eyes:  EOMI. Anicteric sclerae, PERRL.        Data Review:    Review and/or order of clinical lab test  Review and/or order of tests in the radiology section of C[facility]  Review and/or order of tests in the medicine section of [facility]      Labs:     Recent Labs      08/26/17 0437 08/24/17 2156   WBC  2.4*  2.9*   HGB  8.9*  11.4*   HCT  27.5*  34.0*   PLT  249  303     Recent Labs      08/26/17   0437  08/25/17   1258  08/24/17 2156   NA  140   --   136   K  3.6  3.8  2.6*   CL  107   --   100   CO2  25   --   30   BUN  14   --   23*   CREA  0.52*   --   0.82   GLU  79   --   105*   CA  7.0*   --   8.0*   MG   --   2.0   --      Recent Labs      08/26/17 0437 08/24/17 2156   SGOT  21  62*   ALT  29  52   AP  68  108   TBILI  0.2  0.4   TP  6.3*  8.2   ALB  1.9*  2.5*   GLOB  4.4*  5.7*     No results for input(s): INR, PTP, APTT in the last 72 hours. No lab exists for component: INREXT, INREXT   No results for input(s): FE, TIBC, PSAT, FERR in the last 72 hours. No results found for: FOL, RBCF   No results for input(s): PH, PCO2, PO2 in the last 72 hours. No results for input(s): CPK, CKNDX, TROIQ in the last 72 hours.     No lab exists for component: CPKMB  No results found for: CHOL, CHOLX, CHLST, CHOLV, HDL, LDL, LDLC, DLDLP, TGLX, TRIGL, TRIGP, CHHD, CHHDX  No results found for: [facility]  Lab Results   Component Value Date/Time    Color DARK YELLOW 08/24/2017 11:04 PM    Appearance CLEAR 08/24/2017 11:04 PM    Specific gravity 1.030 08/24/2017 11:04 PM    pH (UA) 5.5 08/24/2017 11:04 PM    Protein 300 08/24/2017 11:04 PM    Glucose NEGATIVE  08/24/2017 11:04 PM    Ketone TRACE 08/24/2017 11:04 PM    Bilirubin NEGATIVE  12/31/2015 04:47 PM    Urobilinogen 1.0 08/24/2017 11:04 PM    Nitrites NEGATIVE  08/24/2017 11:04 PM    Leukocyte Esterase NEGATIVE  08/24/2017 11:04 PM    Epithelial cells FEW 08/24/2017 11:04 PM    Bacteria NEGATIVE  08/24/2017 11:04 PM    WBC 0-4 08/24/2017 11:04 PM    RBC 5-10 08/24/2017 11:04 PM         Medications Reviewed:     Current Facility-Administered Medications   Medication Dose Route Frequency    guaiFENesin ER (MUCINEX) tablet 1,200 mg  1,200 mg Oral BID    HYDROmorphone (DILAUDID) tablet 2 mg  2 mg Oral Q4H PRN    acyclovir (ZOVIRAX) tablet 400 mg  400 mg Oral BID    senna-docusate (PERICOLACE) 8.6-50 mg per tablet 1 Tab  1 Tab Oral DAILY    dextrose 5% - 0.45% NaCl with KCl 20 mEq/L infusion  75 mL/hr IntraVENous CONTINUOUS    sodium chloride (NS) flush 5-10 mL  5-10 mL IntraVENous Q8H    sodium chloride (NS) flush 5-10 mL  5-10 mL IntraVENous PRN    cefTRIAXone (ROCEPHIN) 1 g in 0.9% sodium chloride (MBP/ADV) 50 mL  1 g IntraVENous Q24H    albuterol-ipratropium (DUO-NEB) 2.5 MG-0.5 MG/3 ML  3 mL Nebulization Q4H PRN    azithromycin (ZITHROMAX) 500 mg in 0.9% sodium chloride (MBP/ADV) 250 mL  500 mg IntraVENous Q24H    fentaNYL (DURAGESIC) 100 mcg/hr patch 1 Patch  1 Patch TransDERmal Q48H    ondansetron (ZOFRAN) injection 4 mg  4 mg IntraVENous Q6H PRN    tamsulosin (FLOMAX) capsule 0.4 mg  0.4 mg Oral QHS    citalopram (CELEXA) tablet 40 mg  40 mg Oral 7am    enoxaparin (LOVENOX) injection 40 mg  40 mg SubCUTAneous Q24H    guaiFENesin-dextromethorphan (ROBITUSSIN DM) 100-10 mg/5 mL syrup 10 mL  10 mL Oral Q6H PRN    ketorolac (TORADOL) injection 15 mg  15 mg IntraVENous Q6H PRN     ______________________________________________________________________  EXPECTED LENGTH OF STAY: 2d 19h  ACTUAL LENGTH OF STAY:          1                 Lee Ann Valentin NP

## 2017-08-27 PROBLEM — B34.8 INFECTION DUE TO PARAINFLUENZA VIRUS 4: Status: ACTIVE | Noted: 2017-08-27

## 2017-08-27 PROBLEM — C90.00 MULTIPLE MYELOMA (HCC): Status: ACTIVE | Noted: 2017-08-27

## 2017-08-27 PROBLEM — A41.9 SEPSIS (HCC): Status: ACTIVE | Noted: 2017-08-25

## 2017-08-27 PROBLEM — R00.0 TACHYCARDIA: Status: RESOLVED | Noted: 2017-08-25 | Resolved: 2017-08-27

## 2017-08-27 LAB
BACTERIA SPEC CULT: NORMAL
BASOPHILS # BLD: 0.1 K/UL (ref 0–0.1)
BASOPHILS NFR BLD: 3 % (ref 0–1)
DIFFERENTIAL METHOD BLD: ABNORMAL
EOSINOPHIL # BLD: 0.1 K/UL (ref 0–0.4)
EOSINOPHIL NFR BLD: 4 % (ref 0–7)
ERYTHROCYTE [DISTWIDTH] IN BLOOD BY AUTOMATED COUNT: 15.1 % (ref 11.5–14.5)
ERYTHROCYTE [DISTWIDTH] IN BLOOD BY AUTOMATED COUNT: 15.3 % (ref 11.5–14.5)
GRAM STN SPEC: NORMAL
HCT VFR BLD AUTO: 25.2 % (ref 36.6–50.3)
HCT VFR BLD AUTO: 25.5 % (ref 36.6–50.3)
HGB BLD-MCNC: 8.1 G/DL (ref 12.1–17)
HGB BLD-MCNC: 8.1 G/DL (ref 12.1–17)
LYMPHOCYTES # BLD: 0.3 K/UL (ref 0.8–3.5)
LYMPHOCYTES NFR BLD: 19 % (ref 12–49)
MCH RBC QN AUTO: 27.6 PG (ref 26–34)
MCH RBC QN AUTO: 28.2 PG (ref 26–34)
MCHC RBC AUTO-ENTMCNC: 31.8 G/DL (ref 30–36.5)
MCHC RBC AUTO-ENTMCNC: 32.1 G/DL (ref 30–36.5)
MCV RBC AUTO: 87 FL (ref 80–99)
MCV RBC AUTO: 87.8 FL (ref 80–99)
MONOCYTES # BLD: 0.2 K/UL (ref 0–1)
MONOCYTES NFR BLD: 12 % (ref 5–13)
NEUTS BAND NFR BLD MANUAL: 5 % (ref 0–6)
NEUTS SEG # BLD: 1.1 K/UL (ref 1.8–8)
NEUTS SEG NFR BLD: 57 % (ref 32–75)
PLATELET # BLD AUTO: 231 K/UL (ref 150–400)
PLATELET # BLD AUTO: 243 K/UL (ref 150–400)
RBC # BLD AUTO: 2.87 M/UL (ref 4.1–5.7)
RBC # BLD AUTO: 2.93 M/UL (ref 4.1–5.7)
RBC MORPH BLD: ABNORMAL
RBC MORPH BLD: ABNORMAL
SERVICE CMNT-IMP: NORMAL
WBC # BLD AUTO: 1.7 K/UL (ref 4.1–11.1)
WBC # BLD AUTO: 1.8 K/UL (ref 4.1–11.1)
WBC MORPH BLD: ABNORMAL

## 2017-08-27 PROCEDURE — 65270000029 HC RM PRIVATE

## 2017-08-27 PROCEDURE — 36415 COLL VENOUS BLD VENIPUNCTURE: CPT | Performed by: INTERNAL MEDICINE

## 2017-08-27 PROCEDURE — 85025 COMPLETE CBC W/AUTO DIFF WBC: CPT | Performed by: INTERNAL MEDICINE

## 2017-08-27 PROCEDURE — 74011250636 HC RX REV CODE- 250/636: Performed by: NURSE PRACTITIONER

## 2017-08-27 PROCEDURE — 74011000250 HC RX REV CODE- 250: Performed by: FAMILY MEDICINE

## 2017-08-27 PROCEDURE — 85027 COMPLETE CBC AUTOMATED: CPT | Performed by: INTERNAL MEDICINE

## 2017-08-27 PROCEDURE — 74011250637 HC RX REV CODE- 250/637: Performed by: HOSPITALIST

## 2017-08-27 PROCEDURE — 74011250636 HC RX REV CODE- 250/636: Performed by: FAMILY MEDICINE

## 2017-08-27 PROCEDURE — 74011250637 HC RX REV CODE- 250/637: Performed by: NURSE PRACTITIONER

## 2017-08-27 PROCEDURE — 94640 AIRWAY INHALATION TREATMENT: CPT

## 2017-08-27 RX ADMIN — ENOXAPARIN SODIUM 40 MG: 40 INJECTION SUBCUTANEOUS at 21:54

## 2017-08-27 RX ADMIN — ONDANSETRON 4 MG: 2 INJECTION INTRAMUSCULAR; INTRAVENOUS at 16:39

## 2017-08-27 RX ADMIN — ACYCLOVIR 400 MG: 800 TABLET ORAL at 09:52

## 2017-08-27 RX ADMIN — TAMSULOSIN HYDROCHLORIDE 0.4 MG: 0.4 CAPSULE ORAL at 21:54

## 2017-08-27 RX ADMIN — Medication 10 ML: at 16:29

## 2017-08-27 RX ADMIN — ACYCLOVIR 400 MG: 800 TABLET ORAL at 17:57

## 2017-08-27 RX ADMIN — IPRATROPIUM BROMIDE AND ALBUTEROL SULFATE 3 ML: .5; 3 SOLUTION RESPIRATORY (INHALATION) at 10:11

## 2017-08-27 RX ADMIN — Medication 10 ML: at 00:26

## 2017-08-27 RX ADMIN — AZITHROMYCIN MONOHYDRATE 500 MG: 500 INJECTION, POWDER, LYOPHILIZED, FOR SOLUTION INTRAVENOUS at 00:22

## 2017-08-27 RX ADMIN — ONDANSETRON 4 MG: 2 INJECTION INTRAMUSCULAR; INTRAVENOUS at 09:41

## 2017-08-27 RX ADMIN — ONDANSETRON 4 MG: 2 INJECTION INTRAMUSCULAR; INTRAVENOUS at 00:44

## 2017-08-27 RX ADMIN — GUAIFENESIN 1200 MG: 600 TABLET, EXTENDED RELEASE ORAL at 09:00

## 2017-08-27 RX ADMIN — Medication 10 ML: at 05:48

## 2017-08-27 RX ADMIN — GUAIFENESIN 1200 MG: 600 TABLET, EXTENDED RELEASE ORAL at 17:57

## 2017-08-27 RX ADMIN — DOCUSATE SODIUM AND SENNOSIDES 1 TABLET: 8.6; 5 TABLET, FILM COATED ORAL at 09:52

## 2017-08-27 RX ADMIN — CITALOPRAM HYDROBROMIDE 40 MG: 20 TABLET ORAL at 09:59

## 2017-08-27 RX ADMIN — Medication 10 ML: at 21:54

## 2017-08-27 NOTE — PROGRESS NOTES
Hospitalist Progress Note  Muhstaq Jiménez NP  Office: [phone]  Cell: [phone]      Date of Service:  2017  NAME:  Farooq Multani  :  1955  MRN:  [id number]      Admission Summary:   Mr. Josette Carreno is a 59 yo male with PMH significant for multiple myeloma and s/p stem cell transplant x 2. Presented to ED on  with fever, fatigue, feeling unwell, nausea/vomiting. Actively on chemotherapy followed by Dr. Zakia Saunders. Admitted for sepsis treatment and work up. Interval history / Subjective:   Looks actually improved today and more alert and lively. He ate dinner last night and some breakfast. Had a little nausea this morning but overall says he feels better. He is asking to get up and walk today and shower which we agreed to with nursing support.       Assessment & Plan:     Sepsis (POA) related to CAP   - Fluid resuscitated   - Initially started on Azithromycin and Ceftriaxone but cultures negative to date and likely all viral related so will stop ()  - Blood cultures  NGTD  - UA negative  - Lactate ok    Parainfluenza 4 (POA), likely exposed by family  - CT chest with possible infectious etiology and B/L tree-in-budd opacities  - Unfortunately, supportive care is really all that is recommended   - Droplet precautions  - Wean O2 as able for O2 sats > 92%  - Mucinex    Multiple myeloma  - Followed by Dr. Zakia Saunders who is following  - Chemo will be on hold until infection ruled out  - Some neutropenia today so monitor and start neutropenic precautions    Hypokalemia (POA)  - K+ 2.6 on admission and now 3.8 on recheck    Dehydration  - Renal function ok on arrival but minimal UO suspect due to poor PO intake and sepsis  - Responded well to IVF and UO much better  - Stop IVF when PO appetite better    Generalized abdominal pain  - Resolved and described more as nauseated  - No acute process on CT A/P and only 1 episode of diarrhea on Wednesday  - D/C c diff testing as no stool     Code status: Full  DVT prophylaxis: Lovenox    Care Plan discussed with: Patient, Wife, RN, Attending on 8/27  Disposition: Home when medically improved. Hopefully Monday or Tuesday     Hospital Problems  Date Reviewed: 8/27/2017          Codes Class Noted POA    * (Principal)Infection due to parainfluenza virus 4 ICD-10-CM: B34.8  ICD-9-CM: 079.89  8/27/2017 Yes        Multiple myeloma ([facility] ICD-10-CM: C90.00  ICD-9-CM: 203.00  8/27/2017 Unknown        Acute hypokalemia ICD-10-CM: E87.6  ICD-9-CM: 276.8  8/25/2017 Unknown        Sepsis ([facility] ICD-10-CM: A41.9  ICD-9-CM: 038.9, 995.91  8/25/2017 Yes                Review of Systems:   + regular BM, no CP, + SOB with exertion, no fevers/chills overnight, no leg swelling      Vital Signs:    Last 24hrs VS reviewed since prior progress note. Most recent are:  Visit Vitals    /74    Pulse 70    Temp 98.8 °F (37.1 °C)    Resp 18    Ht 5' 11\" (1.803 m)    Wt 70.9 kg (156 lb 3.2 oz)    SpO2 96%    BMI 21.79 kg/m2         Intake/Output Summary (Last 24 hours) at 08/27/17 1258  Last data filed at 08/27/17 0715   Gross per 24 hour   Intake             1336 ml   Output              300 ml   Net             1036 ml        Physical Examination:             Constitutional:  No acute distress, cooperative, appears more alert   ENT:  Oral mucous moist, oropharynx benign. Neck supple   Resp:  Scattered rhonchi and coarse throughout but no audible wheeze. No accessory muscle use   CV:  Regular rhythm, normal rate, no murmurs, gallops, rubs    GI:  Soft, non distended, non tender. normoactive bowel sounds, no hepatosplenomegaly     Musculoskeletal:  No edema, warm, 2+ pulses throughout. WATSON with 5/5 strength    Neurologic:  Moves all extremities. AAOx3, CN II-XII reviewed and exam is non-focal     Psych:  Good insight, Not anxious nor agitated. Skin:  Good turgor, no rashes or ulcers  Eyes:  EOMI. Anicteric sclerae, PERRL.        Data Review:    Review and/or order of clinical lab test  Review and/or order of tests in the radiology section of CPT  Review and/or order of tests in the medicine section of [facility]      Labs:     Recent Labs      08/27/17   0547  08/26/17 0437   WBC  1.8*  1.7*  2.4*   HGB  8.1*  8.1*  8.9*   HCT  25.2*  25.5*  27.5*   PLT  243  231  249     Recent Labs      08/26/17   0437  08/25/17   1258  08/24/17   2156   NA  140   --   136   K  3.6  3.8  2.6*   CL  107   --   100   CO2  25   --   30   BUN  14   --   23*   CREA  0.52*   --   0.82   GLU  79   --   105*   CA  7.0*   --   8.0*   MG   --   2.0   --      Recent Labs      08/26/17 0437 08/24/17 2156   SGOT  21  62*   ALT  29  52   AP  68  108   TBILI  0.2  0.4   TP  6.3*  8.2   ALB  1.9*  2.5*   GLOB  4.4*  5.7*     No results for input(s): INR, PTP, APTT in the last 72 hours. No lab exists for component: INREXT, INREXT   No results for input(s): FE, TIBC, PSAT, FERR in the last 72 hours. No results found for: FOL, RBCF   No results for input(s): PH, PCO2, PO2 in the last 72 hours. No results for input(s): CPK, CKNDX, TROIQ in the last 72 hours.     No lab exists for component: CPKMB  No results found for: CHOL, CHOLX, CHLST, CHOLV, HDL, LDL, LDLC, DLDLP, TGLX, TRIGL, TRIGP, CHHD, CHHDX  No results found for: [facility]  Lab Results   Component Value Date/Time    Color DARK YELLOW 08/24/2017 11:04 PM    Appearance CLEAR 08/24/2017 11:04 PM    Specific gravity 1.030 08/24/2017 11:04 PM    pH (UA) 5.5 08/24/2017 11:04 PM    Protein 300 08/24/2017 11:04 PM    Glucose NEGATIVE  08/24/2017 11:04 PM    Ketone TRACE 08/24/2017 11:04 PM    Bilirubin NEGATIVE  12/31/2015 04:47 PM    Urobilinogen 1.0 08/24/2017 11:04 PM    Nitrites NEGATIVE  08/24/2017 11:04 PM    Leukocyte Esterase NEGATIVE  08/24/2017 11:04 PM    Epithelial cells FEW 08/24/2017 11:04 PM    Bacteria NEGATIVE  08/24/2017 11:04 PM    WBC 0-4 08/24/2017 11:04 PM    RBC 5-10 08/24/2017 11:04 PM Medications Reviewed:     Current Facility-Administered Medications   Medication Dose Route Frequency    guaiFENesin ER (MUCINEX) tablet 1,200 mg  1,200 mg Oral BID    HYDROmorphone (DILAUDID) tablet 2 mg  2 mg Oral Q4H PRN    acyclovir (ZOVIRAX) tablet 400 mg  400 mg Oral BID    senna-docusate (PERICOLACE) 8.6-50 mg per tablet 1 Tab  1 Tab Oral DAILY    dextrose 5% - 0.45% NaCl with KCl 20 mEq/L infusion  75 mL/hr IntraVENous CONTINUOUS    acetaminophen (TYLENOL) tablet 650 mg  650 mg Oral Q6H PRN    sodium chloride (NS) flush 5-10 mL  5-10 mL IntraVENous Q8H    sodium chloride (NS) flush 5-10 mL  5-10 mL IntraVENous PRN    albuterol-ipratropium (DUO-NEB) 2.5 MG-0.5 MG/3 ML  3 mL Nebulization Q4H PRN    fentaNYL (DURAGESIC) 100 mcg/hr patch 1 Patch  1 Patch TransDERmal Q48H    ondansetron (ZOFRAN) injection 4 mg  4 mg IntraVENous Q6H PRN    tamsulosin (FLOMAX) capsule 0.4 mg  0.4 mg Oral QHS    citalopram (CELEXA) tablet 40 mg  40 mg Oral 7am    enoxaparin (LOVENOX) injection 40 mg  40 mg SubCUTAneous Q24H    guaiFENesin-dextromethorphan (ROBITUSSIN DM) 100-10 mg/5 mL syrup 10 mL  10 mL Oral Q6H PRN    ketorolac (TORADOL) injection 15 mg  15 mg IntraVENous Q6H PRN     ______________________________________________________________________  EXPECTED LENGTH OF STAY: 2d 19h  ACTUAL LENGTH OF STAY:          2                 Lee Ann Baeza NP

## 2017-08-27 NOTE — PROGRESS NOTES
Bedside shift change report given to nestor sosa (oncoming nurse) by Ariel Gifford (offgoing nurse). Report included the following information SBAR, Kardex, Intake/Output, MAR and Recent Results.

## 2017-08-27 NOTE — PROGRESS NOTES
Patient seen and examined. He feels much better today. Chest is still congested but much less so than yesterday. Was febrile overnight. On exam lungs are rhonchorous but improved from yesterday. Heart is RRR. I agree with the NP's A&P. Cultures negative - monitor off abx.

## 2017-08-27 NOTE — PROGRESS NOTES
Bedside shift change report given to Bee Roman RN (oncoming nurse) by Sarah Pierson (offgoing nurse). Report included the following information SBAR and MAR.

## 2017-08-27 NOTE — PROGRESS NOTES
Heme/Onc; [facility]    [facility] physician: Scarlet Bo    EMR reviewed  In with URI. He reports doing better today. Was up moving around in room. Wife agrees that he is doing better. No new issues.     Comprehensive ROS performed and o/w negative    99.1; 89; 156/84; 93% on 4L  NAD  Regular  No distress; on supplemental O2  abd soft; NT; ND      1.8<8.1>243    from 8/26/17: Cr 0.52    A/P  1) Myeloma  -plan per Dr. Scarlet Bo  -pomalidomide maintenance on hold at present  -can hold Ca/Vit D as well while inpatient    2) URI  -on ABX (reviewed  -increase risk of infection overall due to underlying myeloma and maintenance treatment    3) Weight loss, FTT  -due to URI  -expect improvement as he recovers from infection    4) Leukopenia and anemia  -from combination of pomalidomide and current infection  -no need transfusion  -Hgb of 11.4 on admission was likely spurious (was hemoconcentrated due to dehydration)    Will continue to follow

## 2017-08-28 VITALS
HEIGHT: 71 IN | TEMPERATURE: 98 F | BODY MASS INDEX: 21.87 KG/M2 | DIASTOLIC BLOOD PRESSURE: 67 MMHG | SYSTOLIC BLOOD PRESSURE: 112 MMHG | HEART RATE: 81 BPM | RESPIRATION RATE: 15 BRPM | WEIGHT: 156.2 LBS | OXYGEN SATURATION: 94 %

## 2017-08-28 LAB
ALBUMIN SERPL ELPH-MCNC: 2.1 G/DL (ref 2.9–4.4)
ALBUMIN/GLOB SERPL: 0.6 {RATIO} (ref 0.7–1.7)
ALPHA1 GLOB SERPL ELPH-MCNC: 0.3 G/DL (ref 0–0.4)
ALPHA2 GLOB SERPL ELPH-MCNC: 1 G/DL (ref 0.4–1)
B-GLOBULIN SERPL ELPH-MCNC: 0.9 G/DL (ref 0.7–1.3)
BASOPHILS # BLD: 0 K/UL (ref 0–0.1)
BASOPHILS NFR BLD: 1 % (ref 0–1)
DIFFERENTIAL METHOD BLD: ABNORMAL
EOSINOPHIL # BLD: 0.1 K/UL (ref 0–0.4)
EOSINOPHIL NFR BLD: 7 % (ref 0–7)
ERYTHROCYTE [DISTWIDTH] IN BLOOD BY AUTOMATED COUNT: 14.8 % (ref 11.5–14.5)
GAMMA GLOB SERPL ELPH-MCNC: 1 G/DL (ref 0.4–1.8)
GLOBULIN SER CALC-MCNC: 3.3 G/DL (ref 2.2–3.9)
HCT VFR BLD AUTO: 25.6 % (ref 36.6–50.3)
HGB BLD-MCNC: 8.4 G/DL (ref 12.1–17)
KAPPA LC FREE SER-MCNC: 78 MG/L (ref 3.3–19.4)
KAPPA LC FREE/LAMBDA FREE SER: 0.97 {RATIO} (ref 0.26–1.65)
LAMBDA LC FREE SERPL-MCNC: 80.3 MG/L (ref 5.7–26.3)
LYMPHOCYTES # BLD: 0.5 K/UL (ref 0.8–3.5)
LYMPHOCYTES NFR BLD: 26 % (ref 12–49)
M PROTEIN SERPL ELPH-MCNC: ABNORMAL G/DL
MCH RBC QN AUTO: 28.5 PG (ref 26–34)
MCHC RBC AUTO-ENTMCNC: 32.8 G/DL (ref 30–36.5)
MCV RBC AUTO: 86.8 FL (ref 80–99)
MONOCYTES # BLD: 0.2 K/UL (ref 0–1)
MONOCYTES NFR BLD: 9 % (ref 5–13)
NEUTS BAND NFR BLD MANUAL: 3 % (ref 0–6)
NEUTS SEG # BLD: 1.1 K/UL (ref 1.8–8)
NEUTS SEG NFR BLD: 54 % (ref 32–75)
PLATELET # BLD AUTO: 251 K/UL (ref 150–400)
PROT SERPL-MCNC: 5.4 G/DL (ref 6–8.5)
RBC # BLD AUTO: 2.95 M/UL (ref 4.1–5.7)
RBC MORPH BLD: ABNORMAL
WBC # BLD AUTO: 1.9 K/UL (ref 4.1–11.1)
WBC MORPH BLD: ABNORMAL

## 2017-08-28 PROCEDURE — 85025 COMPLETE CBC W/AUTO DIFF WBC: CPT | Performed by: NURSE PRACTITIONER

## 2017-08-28 PROCEDURE — 77010033678 HC OXYGEN DAILY

## 2017-08-28 PROCEDURE — 74011250636 HC RX REV CODE- 250/636: Performed by: NURSE PRACTITIONER

## 2017-08-28 PROCEDURE — 74011250637 HC RX REV CODE- 250/637: Performed by: NURSE PRACTITIONER

## 2017-08-28 PROCEDURE — 36415 COLL VENOUS BLD VENIPUNCTURE: CPT | Performed by: NURSE PRACTITIONER

## 2017-08-28 RX ORDER — ALBUTEROL SULFATE 90 UG/1
1 AEROSOL, METERED RESPIRATORY (INHALATION)
Qty: 1 INHALER | Refills: 2 | Status: SHIPPED | OUTPATIENT
Start: 2017-08-28 | End: 2019-02-18

## 2017-08-28 RX ORDER — FLUTICASONE PROPIONATE 50 MCG
2 SPRAY, SUSPENSION (ML) NASAL DAILY
Qty: 1 BOTTLE | Refills: 2 | Status: SHIPPED | OUTPATIENT
Start: 2017-08-28 | End: 2019-02-18

## 2017-08-28 RX ORDER — GUAIFENESIN/DEXTROMETHORPHAN 100-10MG/5
10 SYRUP ORAL
Qty: 1 BOTTLE | Refills: 0 | Status: SHIPPED | OUTPATIENT
Start: 2017-08-28 | End: 2017-09-07

## 2017-08-28 RX ADMIN — Medication 10 ML: at 06:34

## 2017-08-28 RX ADMIN — CITALOPRAM HYDROBROMIDE 40 MG: 20 TABLET ORAL at 06:34

## 2017-08-28 RX ADMIN — ACYCLOVIR 400 MG: 800 TABLET ORAL at 09:17

## 2017-08-28 RX ADMIN — DEXTROSE MONOHYDRATE, SODIUM CHLORIDE, AND POTASSIUM CHLORIDE 75 ML/HR: 50; 4.5; 1.49 INJECTION, SOLUTION INTRAVENOUS at 02:54

## 2017-08-28 RX ADMIN — DOCUSATE SODIUM AND SENNOSIDES 1 TABLET: 8.6; 5 TABLET, FILM COATED ORAL at 09:16

## 2017-08-28 RX ADMIN — GUAIFENESIN 1200 MG: 600 TABLET, EXTENDED RELEASE ORAL at 09:16

## 2017-08-28 NOTE — PROGRESS NOTES
Hematology-Oncology Progress Note    Otto Hirsch  1955  [id number]  8/28/2017    Follow-up for: multiple myeloma     [x]        Chart notes since last visit reviewed   [x]        Medications reviewed for allergies and interactions       Case discussed with the following:         []                            []        Nursing Staff                                                                         []        Pathologist                                                                        [x]        FAMILY      Subjective:     Spoke with patient who complains of: feels much better, no c/o pain, sob, or fever    Objective:   Patient Vitals for the past 24 hrs:   BP Temp Pulse Resp SpO2   08/28/17 0913 112/67 98 °F (36.7 °C) 81 15 94 %   08/28/17 0237 160/79 98 °F (36.7 °C) (!) 58 15 98 %   08/27/17 2042 154/82 98.4 °F (36.9 °C) 60 16 97 %   08/27/17 1507 149/80 98.2 °F (36.8 °C) 88 18 98 %       REVIEW OF SYSTEMS:    Constitutional: negative fever, negative chills, negative weight loss  Eyes:   negative visual changes  ENT:   negative sore throat, tongue or lip swelling  Respiratory:  negative cough, negative dyspnea  Cards:  negative for chest pain, palpitations, lower extremity edema  GI:   negative for nausea, vomiting, diarrhea, and abdominal pain  Neuro:  negative for headaches, dizziness, vertigo  [x]                        Full ROS o/w normal/non contributor    Constitutional:  Patient looks  []        Sick  []        Frail  [x]        Better                                                 []        Depressed    HEENT:  [x]   NC                         []   AT               []    ALOPECIA           Eyes: [x]   Normal               []    Icteric  Oropharynx: [x]    Normal                  []  Thrush               []   Dry  Mucositis: [x]    None                 Grade: []        I  []        II  []        III  []        IV  Neck:   [x]   Supple                  []  Rigid JVD:    []   ABSENT       []   PRESENT  Lymphadenopathy:   []   None Noted            []   PRESENT    Chest:  []   Clear               [x]    Rhonchi        In bases              Dec'd @     []  Right Base           []   Left Base    CV:             [x]   Regular              []  Irregular               []   Tachy                []   Murmur  Abdominal:   [x]    Soft              []   NON-tender               []   Tender      BS:    []   ABSENT                   []   PRESENT  Liver:     [x]  NON-palp                  []   EDGE- palp  Spleen: [x]   NON-palp                   []  EDGE - palp  Mass:   [x]   ABSENT                          []  PRESENT  Extr:    [x]  Lymphedema             []   Cyanosis      []  Clubbing  Edema:     [x]   NONE       []   PRESENT  Skin:  Intact [x]           Purpura []        Rash: [x]   ABSENT       []  PRESENT  Neuro:  [x]        Normal  []        Confused      Available labs reviewed:  Labs:    Recent Results (from the past 24 hour(s))   CBC WITH AUTOMATED DIFF    Collection Time: 08/28/17  2:42 AM   Result Value Ref Range    WBC 1.9 (L) 4.1 - 11.1 K/uL    RBC 2.95 (L) 4.10 - 5.70 M/uL    HGB 8.4 (L) 12.1 - 17.0 g/dL    HCT 25.6 (L) 36.6 - 50.3 %    MCV 86.8 80.0 - 99.0 FL    MCH 28.5 26.0 - 34.0 PG    MCHC 32.8 30.0 - 36.5 g/dL    RDW 14.8 (H) 11.5 - 14.5 %    PLATELET 770 617 - 363 K/uL    NEUTROPHILS 54 32 - 75 %    BAND NEUTROPHILS 3 0 - 6 %    LYMPHOCYTES 26 12 - 49 %    MONOCYTES 9 5 - 13 %    EOSINOPHILS 7 0 - 7 %    BASOPHILS 1 0 - 1 %    ABS. NEUTROPHILS 1.1 (L) 1.8 - 8.0 K/UL    ABS. LYMPHOCYTES 0.5 (L) 0.8 - 3.5 K/UL    ABS. MONOCYTES 0.2 0.0 - 1.0 K/UL    ABS. EOSINOPHILS 0.1 0.0 - 0.4 K/UL    ABS.  BASOPHILS 0.0 0.0 - 0.1 K/UL    DF MANUAL      RBC COMMENTS ANISOCYTOSIS  1+        RBC COMMENTS OVALOCYTES  PRESENT        RBC COMMENTS TIMO CELLS  PRESENT        WBC COMMENTS TOXIC GRANULATION         Available Xrays reviewed:    Chemotherapy monitored and toxicities assessed:    Assessment and Plan   1. Myeloma,,, currently under control on maintenance pomylist after transplant,,, this drug is held for now, will resume in 1 week  2. Febrile illness,,, secondary to viral syndrome. . Cultures are negative and he is afebrile off abx  3. Anemia/leukopenia. .. Secondary to #2. .. These counts should rebound in the short term  4. Disposition. .. I think he is ok for discharge from our standpoint  5.  Isabelle Rajput MD

## 2017-08-28 NOTE — PROGRESS NOTES
NUTRITION COMPLETE ASSESSMENT    RECOMMENDATIONS:   1. Continue current diet, Supplements (specify)  2. Please document po intake in flow sheets  3. Consider adding MVI  4. Weekly weights     Interventions/Plan:   Food/Nutrient Delivery:           RD to add supplements, snacks    Assessment:   Reason for Assessment:   [x]BPA/MST Referral     Diet: Regular  Supplements: None  Nutritionally Significant Medications: [x] Reviewed & Includes: Pericolace, D5 and NaCl @75mL/hr   Meal Intake: No data found. Subjective:  \"I'm eating fair. I've lost a lot of weight. \"    Objective:  Pt seen for MST. Pt admitted with fever, tachycardia, hypokalemia. PMHx: multiple myeloma on chemotherapy, s/p stem cell transplant, and hyperlipidemia. Reviewed chart, spoke with pt. Pt states ~30 lb (16%) weight loss over past 5 months which is severe in nature; fair intake PTA and currently. Indicated N/V/diarrhea for 1 week PTA with wt loss. States drinking Ensure occasionally at home. Offered Ensure and snacks (pt likes yogurt, pb crackers, egg/chicken/tuna salad); pt accepted. Ensure Enlive BID provides 700 kcal, 40g protein meeting 33% caloric, 43% protein needs if 100% consumed. Meets Criteria for Chronic Malnutrition   [x] Severe Malnutrition, as evidenced by:   [x] Severe muscle wasting, loss of subcutaneous fat   [x] Nutritional intake of <75% of recommended intake for >1 month   [x] Weight loss of  >5% in 1 month, >7.5% in 3 months, >10% in 6 months, >20% in 1 year   [] Severe edema     RD to follow po intake meals, snacks, supplements, wt status.      Estimated Nutrition Needs:   Kcals/day: 2124 Kcals/day (1217-8362 kcal/day (HB x 1.4-1.5))  Protein: 92 g (92-99g/day (1.3-1.4g/kg))  Fluid: 2200 ml (1mL/kcal)     Based On: [place]  Weight Used: Actual wt    Pt expected to meet estimated nutrient needs:  []   Yes     []  No  [x] Unable to predict at this time- but likely if pt consumes supplements/snacks    Nutrition Diagnosis:   1. Unintended weight loss related to fair po intake, cancer diagnosis as evidenced by pt with ~30 lb wt loss over past 5 months      2. Inadequate protein-energy intake related to fair intake, cancer diagnosis as evidenced by pt consuming ~50% meals    Goals:       Pt will consume at least 50% meals, supplements, snacks over next 5-7 days     Monitoring & Evaluation:    - Total energy intake, Liquid meal replacement   - Weight/weight change   -      Previous Nutrition Goals Met:  N/A  Previous Recommendations:      N/A    Education & Discharge Needs:   [] None Identified   [x] Identified and addressed- discussed supplements/snacks when d/c    [] Participated in care plan, discharge planning, and/or interdisciplinary rounds        Cultural, Anabaptist and ethnic food preferences identified:   None    Skin Integrity: [x]Intact  []Other  Edema: [x]None []Other  Last BM: No value  Food Allergies: [x]None []Other    Anthropometrics:    Weight Loss Metrics 8/24/2017 3/29/2016 12/31/2015   Today's Wt 156 lb 3.2 oz 184 lb 196 lb   BMI 21.79 kg/m2 25.67 kg/m2 27.35 kg/m2      Last 3 Recorded Weights in this Encounter    08/24/17 2145   Weight: 70.9 kg (156 lb 3.2 oz)      Weight Source: Standing scale (comment)  Height: 5' 11\" (180.3 cm),    Body mass index is 21.79 kg/(m^2).    ,     ,      Labs:    Lab Results   Component Value Date/Time    Sodium 140 08/26/2017 04:37 AM    Potassium 3.6 08/26/2017 04:37 AM    Chloride 107 08/26/2017 04:37 AM    CO2 25 08/26/2017 04:37 AM    Glucose 79 08/26/2017 04:37 AM    BUN 14 08/26/2017 04:37 AM    Creatinine 0.52 08/26/2017 04:37 AM    Calcium 7.0 08/26/2017 04:37 AM    Magnesium 2.0 08/25/2017 12:58 PM    Albumin 1.9 08/26/2017 04:37 AM     No results found for: HBA1C, HGBE8, UVM8CMPJ, NRS4NXIY, AOC2GLCK  Lab Results   Component Value Date/Time    Glucose 79 08/26/2017 04:37 AM      Lab Results   Component Value Date/Time    ALT (SGPT) 29 08/26/2017 04:37 AM    AST (SGOT) 21 08/26/2017 04:37 AM    Alk.  phosphatase 68 08/26/2017 04:37 AM    Bilirubin, total 0.2 08/26/2017 04:37 AM        Kristian Rudd RD

## 2017-08-28 NOTE — PROGRESS NOTES
Physical Therapy Screening:  Services are not indicated at this time. [facility] based on results obtained during the nursing admission assessment. The patients chart was reviewed and the patient is not appropriate for a skilled therapy evaluation at this time. Please consult physical therapy if any therapy needs arise. Thank you.     Harleen Rowe, PT

## 2017-08-28 NOTE — DISCHARGE INSTRUCTIONS
Discharge Instructions       PATIENT ID: John Fabian  MRN: [id number]   YOB: 1955    DATE OF ADMISSION: 8/24/2017  9:46 PM    DATE OF DISCHARGE: 8/28/2017    PRIMARY CARE PROVIDER: Cristian Navarro MD     ATTENDING PHYSICIAN: Mikaela Euceda MD  DISCHARGING PROVIDER: Kamala David NP    To contact this individual call [phone] and ask the  to page. If unavailable ask to be transferred the Adult Hospitalist Department. DISCHARGE DIAGNOSES Flu, Sepsis    CONSULTATIONS: IP CONSULT TO ONCOLOGY  IP CONSULT TO HEMATOLOGY    PROCEDURES/SURGERIES: * No surgery found *    PENDING TEST RESULTS:   At the time of discharge the following test results are still pending: none    FOLLOW UP APPOINTMENTS:   Follow-up Information     Follow up With Details Comments Contact Info    Aylin Segundo MD Call in 1 day  [id number] [id number] [phone]             ADDITIONAL CARE RECOMMENDATIONS:   1. Make sure to get plenty of rest and drink lots of water. 2. Please use your incentive spirometer 10 times a hour while awake. 3. Recommending you take Mucinex to help decrease your congestion. DIET: Regular    ACTIVITY: as tolerated    WOUND CARE: none    EQUIPMENT needed: none      DISCHARGE MEDICATIONS:   See Medication Reconciliation Form    · It is important that you take the medication exactly as they are prescribed. · Keep your medication in the bottles provided by the pharmacist and keep a list of the medication names, dosages, and times to be taken in your wallet. · Do not take other medications without consulting your doctor. NOTIFY YOUR PHYSICIAN FOR ANY OF THE FOLLOWING:   Fever over 101 degrees for 24 hours. Chest pain, shortness of breath, fever, chills, nausea, vomiting, diarrhea, change in mentation, falling, weakness, bleeding. Severe pain or pain not relieved by medications.   Or, any other signs or symptoms that you may have questions about.      DISPOSITION:  X  Home With:   OT  PT  HH  RN       SNF/Inpatient Rehab/LTAC    Independent/assisted living    Hospice    Other:     CDMP Checked: Yes X     PROBLEM LIST Updated:   Yes X       Signed:   Rosalind Sousa NP  8/28/2017  12:08 PM

## 2017-08-28 NOTE — PROGRESS NOTES
Bedside shift change report given to Erik Thakur (oncoming nurse) by Chacorta Good (offgoing nurse). Report included the following information SBAR and Kardex.

## 2017-08-28 NOTE — DISCHARGE SUMMARY
Discharge Summary       PATIENT ID: Jonathon Willis  MRN: [id number]   YOB: 1955    DATE OF ADMISSION: 8/24/2017  9:46 PM    DATE OF DISCHARGE: 8/28/2017   PRIMARY CARE PROVIDER: Melissa Guerra MD     ATTENDING PHYSICIAN: Laura Ellis MD  DISCHARGING PROVIDER: Fide Abbott NP    To contact this individual call [phone] and ask the  to page. If unavailable ask to be transferred the Adult Hospitalist Department. CONSULTATIONS: IP CONSULT TO ONCOLOGY  IP CONSULT TO HEMATOLOGY    PROCEDURES/SURGERIES: * No surgery found *    ADMITTING DIAGNOSES & HOSPITAL COURSE:   Dx: Sepsis, Parainfluenza 4, Dehydration, Hypokalemia    Mr. Carmen Grande is a 57 yo male with PMH significant for multiple myeloma and s/p stem cell transplant x 2. Presented to ED on 8/24 with fever, fatigue, feeling unwell, nausea/vomiting. Actively on chemotherapy followed by Dr. Greg Bella. Admitted for sepsis treatment and work up. Viral panel + for Parainfluenza 4. Patient afebrile on room air. Denies any shortness of breath. Tolerating po diet. Patient stable for discharge home. Patient reports that his pcp has moved and he needs a new one. Offered CM to assist however wife and patient report they will get set up as they live 2 hours away.      DISCHARGE DIAGNOSES / PLAN:      Sepsis (POA) related to CAP   - Fluid resuscitated   - Initially started on Azithromycin and Ceftriaxone but cultures negative to date and likely all viral related so will stop (8/27)  - Blood cultures  NGTD  - UA negative   - Lactate ok     Parainfluenza 4 (POA), likely exposed by family  - CT chest with possible infectious etiology and B/L tree-in-budd opacities  - Unfortunately, supportive care is really all that is recommended   -o2 sats stable on RA  - Mucinex  -encourage IS   - albuterol prn      Multiple myeloma  - Followed by Dr. Grge Bella, continue to f/u OP  - Chemo will be on hold for now d/t flu     Hypokalemia (POA)  - K+ 2.6 on admission and now 3.6     Dehydration  - Renal function ok on arrival but minimal UO suspect due to poor PO intake and sepsis  - Responded well to IVF and UO much better  - s/p IVF, now tolerating po diet, encourage po fluids     Generalized abdominal pain  - Resolved  - No acute process on CT A/P and only 1 episode of diarrhea on Wednesday  - D/C c diff testing as no stool           PENDING TEST RESULTS:   At the time of discharge the following test results are still pending: none    FOLLOW UP APPOINTMENTS:    Follow-up Information     Follow up With Details Comments Contact Info    Mario Honeycutt MD   [address] [place] [address]  [phone]             ADDITIONAL CARE RECOMMENDATIONS:   1. Make sure to get plenty of rest and drink lots of water. 2. Please use your incentive spirometer 10 times a hour while awake. 3. Recommending you take Mucinex to help decrease your congestion. DIET: Regular    ACTIVITY: as tolerated    WOUND CARE: none    EQUIPMENT needed: none      DISCHARGE MEDICATIONS:  Current Discharge Medication List      START taking these medications    Details   guaiFENesin ER (MUCINEX) 1,200 mg Ta12 ER tablet Take 1 Tab by mouth two (2) times a day for 7 days. Indications: COUGH  Qty: 14 Tab, Refills: 0      guaiFENesin-dextromethorphan (ROBITUSSIN DM) 100-10 mg/5 mL syrup Take 10 mL by mouth every six (6) hours as needed for up to 10 days. Indications: COUGH  Qty: 1 Bottle, Refills: 0      fluticasone (FLONASE) 50 mcg/actuation nasal spray 2 Sprays by Both Nostrils route daily. Qty: 1 Bottle, Refills: 2      albuterol (PROVENTIL HFA, VENTOLIN HFA, PROAIR HFA) 90 mcg/actuation inhaler Take 1 Puff by inhalation every four (4) hours as needed for Wheezing or Shortness of Breath. Qty: 1 Inhaler, Refills: 2         CONTINUE these medications which have NOT CHANGED    Details   citalopram (CELEXA) 40 mg tablet Take 40 mg by mouth daily.       esomeprazole (NEXIUM) 40 mg capsule Take 40 mg by mouth daily. loratadine (CLARITIN) 10 mg tablet Take 10 mg by mouth daily. tamsulosin (FLOMAX) 0.4 mg capsule Take 0.4 mg by mouth nightly. senna (SENNA) 8.6 mg tablet Take 1 Tab by mouth daily. calcium-cholecalciferol, d3, (CALCIUM 600 + D) 600-125 mg-unit tab Take 2 Tabs by mouth two (2) times a day. fentaNYL (DURAGESIC) 100 mcg/hr PATCH 1 Patch by TransDERmal route every fourty-eight (48) hours. DOCUSATE CALCIUM (STOOL SOFTENER PO) Take 2 Caps by mouth two (2) times a day. ASPIRIN CHILD PO Take 81 mg by mouth daily. acyclovir (ZOVIRAX) 400 mg tablet Take 400 mg by mouth two (2) times a day. HYDROmorphone (DILAUDID) 2 mg tablet Take 2 mg by mouth every four (4) hours as needed for Pain. NOTIFY YOUR PHYSICIAN FOR ANY OF THE FOLLOWING:   Fever over 101 degrees for 24 hours. Chest pain, shortness of breath, fever, chills, nausea, vomiting, diarrhea, change in mentation, falling, weakness, bleeding. Severe pain or pain not relieved by medications. Or, any other signs or symptoms that you may have questions about. DISPOSITION:  X  Home With:   OT  PT  HH  RN       Long term SNF/Inpatient Rehab    Independent/assisted living    Hospice    Other:       PATIENT CONDITION AT DISCHARGE:     Functional status    Poor     Deconditioned    X Independent      Cognition    X Lucid     Forgetful     Dementia      Catheters/lines (plus indication)    Jean     PICC     PEG    X None      Code status   X  Full code     DNR      PHYSICAL EXAMINATION AT DISCHARGE:  nal:  No acute distress, cooperative, alert male sitting in chair   ENT:  Oral mucous moist, oropharynx benign. Neck supple   Resp:  Scattered rhonchi and coarse throughout but no audible wheeze. No accessory muscle use   CV:  Regular rhythm, normal rate, no murmurs, gallops, rubs    GI:  Soft, non distended, non tender.  normoactive bowel sounds, no hepatosplenomegaly     Musculoskeletal:  No edema, warm, 2+ pulses throughout. WATSON with 5/5 strength    Neurologic:  Moves all extremities. AAOx3                                             Psych:  Good insight, Not anxious nor agitated. Skin:  Good turgor, no rashes or ulcers  Eyes:  EOMI. Anicteric sclerae, PERRL.         CHRONIC MEDICAL DIAGNOSES:  Problem List as of 8/28/2017  Date Reviewed: 8/27/2017          Codes Class Noted - Resolved    * (Principal)Infection due to parainfluenza virus 4 ICD-10-CM: B34.8  ICD-9-CM: 079.89  8/27/2017 - Present        Multiple myeloma ([facility] ICD-10-CM: C90.00  ICD-9-CM: 203.00  8/27/2017 - Present        Acute hypokalemia ICD-10-CM: E87.6  ICD-9-CM: 276.8  8/25/2017 - Present        Sepsis ([facility] ICD-10-CM: A41.9  ICD-9-CM: 038.9, 995.91  8/25/2017 - Present        RESOLVED: Tachycardia ICD-10-CM: R00.0  ICD-9-CM: 785.0  8/25/2017 - 8/27/2017              Greater than 30 minutes were spent with the patient on counseling and coordination of care    Signed:   Mega Ervin NP  8/28/2017  12:04 PM

## 2017-08-29 LAB
BACTERIA SPEC CULT: NORMAL
SERVICE CMNT-IMP: NORMAL

## 2018-01-23 ENCOUNTER — HOSPITAL ENCOUNTER (OUTPATIENT)
Dept: PET IMAGING | Age: 63
Discharge: HOME | End: 2018-01-23
Attending: INTERNAL MEDICINE
Payer: COMMERCIAL

## 2018-01-23 VITALS — BODY MASS INDEX: 23.8 KG/M2 | WEIGHT: 170 LBS | HEIGHT: 71 IN

## 2018-01-23 DIAGNOSIS — M25.552 LEFT HIP PAIN: ICD-10-CM

## 2018-01-23 DIAGNOSIS — C90.00 MYELOMA ASSOCIATED AMYLOIDOSIS (HCC): ICD-10-CM

## 2018-01-23 DIAGNOSIS — Z51.11 ENCOUNTER FOR ANTINEOPLASTIC CHEMOTHERAPY: ICD-10-CM

## 2018-01-23 DIAGNOSIS — E85.9 MYELOMA ASSOCIATED AMYLOIDOSIS (HCC): ICD-10-CM

## 2018-01-23 PROCEDURE — A9552 F18 FDG: HCPCS

## 2018-01-23 RX ORDER — SODIUM CHLORIDE 0.9 % (FLUSH) 0.9 %
10 SYRINGE (ML) INJECTION
Status: COMPLETED | OUTPATIENT
Start: 2018-01-23 | End: 2018-01-23

## 2018-01-23 RX ADMIN — Medication 10 ML: at 13:05

## 2018-03-20 ENCOUNTER — HOSPITAL ENCOUNTER (OUTPATIENT)
Dept: GENERAL RADIOLOGY | Age: 63
Discharge: HOME | End: 2018-03-20
Payer: COMMERCIAL

## 2018-03-20 DIAGNOSIS — C90.00 MYELOMA ASSOCIATED AMYLOIDOSIS (HCC): ICD-10-CM

## 2018-03-20 DIAGNOSIS — E85.9 MYELOMA ASSOCIATED AMYLOIDOSIS (HCC): ICD-10-CM

## 2018-03-20 DIAGNOSIS — M25.551 BILATERAL HIP PAIN: ICD-10-CM

## 2018-03-20 DIAGNOSIS — M25.552 BILATERAL HIP PAIN: ICD-10-CM

## 2018-03-20 PROCEDURE — 72072 X-RAY EXAM THORAC SPINE 3VWS: CPT

## 2018-03-20 PROCEDURE — 72100 X-RAY EXAM L-S SPINE 2/3 VWS: CPT

## 2018-04-29 ENCOUNTER — APPOINTMENT (OUTPATIENT)
Dept: GENERAL RADIOLOGY | Age: 63
End: 2018-04-29
Attending: EMERGENCY MEDICINE
Payer: COMMERCIAL

## 2018-04-29 ENCOUNTER — HOSPITAL ENCOUNTER (EMERGENCY)
Age: 63
Discharge: HOME | End: 2018-04-29
Attending: EMERGENCY MEDICINE
Payer: COMMERCIAL

## 2018-04-29 VITALS
RESPIRATION RATE: 16 BRPM | TEMPERATURE: 99.8 F | OXYGEN SATURATION: 98 % | WEIGHT: 162 LBS | SYSTOLIC BLOOD PRESSURE: 107 MMHG | HEIGHT: 71 IN | HEART RATE: 90 BPM | BODY MASS INDEX: 22.68 KG/M2 | DIASTOLIC BLOOD PRESSURE: 61 MMHG

## 2018-04-29 DIAGNOSIS — M25.462 KNEE EFFUSION, LEFT: Primary | ICD-10-CM

## 2018-04-29 LAB
APPEARANCE FLD: ABNORMAL
COLOR FLD: ABNORMAL
MONOS+MACROS NFR FLD: 35 %
NEUTROPHILS NFR FLD: 65 %
NUC CELL # FLD: 6580 /CU MM
RBC # FLD: >100 /CU MM
SPECIMEN SOURCE FLD: ABNORMAL
SPECIMEN SOURCE FLD: NORMAL
URATE FLD-MCNC: 3.1 MG/DL

## 2018-04-29 PROCEDURE — 93971 EXTREMITY STUDY: CPT

## 2018-04-29 PROCEDURE — 75810000054 HC ARTHOCENTISIS JOINT

## 2018-04-29 PROCEDURE — 84560 ASSAY OF URINE/URIC ACID: CPT | Performed by: EMERGENCY MEDICINE

## 2018-04-29 PROCEDURE — 73562 X-RAY EXAM OF KNEE 3: CPT

## 2018-04-29 PROCEDURE — 99282 EMERGENCY DEPT VISIT SF MDM: CPT

## 2018-04-29 PROCEDURE — 89050 BODY FLUID CELL COUNT: CPT | Performed by: EMERGENCY MEDICINE

## 2018-04-29 PROCEDURE — 87205 SMEAR GRAM STAIN: CPT | Performed by: EMERGENCY MEDICINE

## 2018-04-29 PROCEDURE — 74011000250 HC RX REV CODE- 250: Performed by: EMERGENCY MEDICINE

## 2018-04-29 RX ORDER — LIDOCAINE HYDROCHLORIDE 10 MG/ML
10 INJECTION, SOLUTION EPIDURAL; INFILTRATION; INTRACAUDAL; PERINEURAL ONCE
Status: COMPLETED | OUTPATIENT
Start: 2018-04-29 | End: 2018-04-29

## 2018-04-29 RX ADMIN — LIDOCAINE HYDROCHLORIDE 10 ML: 10 INJECTION, SOLUTION EPIDURAL; INFILTRATION; INTRACAUDAL; PERINEURAL at 17:39

## 2018-04-29 NOTE — ROUTINE PROCESS
Patient was discharged and given instructions by Dr. Luiza Schultz. Patient verbalized good understanding of all discharge instructions, prescriptions and f/u care. All questions answered. Pt in stable condition on discharge.

## 2018-04-29 NOTE — PROCEDURES
[facility]  *** FINAL REPORT ***    Name: Juana Kan  MRN: [id number]  : 1955  HIS Order #: [id number]  [address] [facility] Visit #: [id number]  Date: 2018    TYPE OF TEST: Peripheral Venous Testing    REASON FOR TEST  Pain in limb, Limb swelling    Left Leg:-  Deep venous thrombosis:           No  Superficial venous thrombosis:    No  Deep venous insufficiency:        Not examined  Superficial venous insufficiency: Not examined      INTERPRETATION/FINDINGS  PROCEDURE:  Color duplex ultrasound imaging of lower extremity veins. FINDINGS:       Right: The common femoral vein is patent and without evidence of  thrombus. Phasic flow is observed. This extremity was not otherwise  evaluated. Left:   The common femoral, deep femoral, femoral, popliteal,  posterior tibial, peroneal, and great saphenous are patent and without   evidence of thrombus;  each is fully compressible and there is no  narrowing of the flow channel on color Doppler imaging. Phasic flow  is observed in the common femoral vein. IMPRESSION:  No evidence of left lower extremity vein thrombosis. ADDITIONAL COMMENTS    I have personally reviewed the data relevant to the interpretation of  this  study. TECHNOLOGIST: [address].  [facility]  Signed: 2018 05:42 PM    PHYSICIAN: Gilmar Zuleta., MD  Signed: 2018 08:40 AM

## 2018-04-29 NOTE — ED TRIAGE NOTES
Pt reports pain and swelling to left knee with most of the pain to the left medial aspect of knee. Pt states he is having trouble bearing weight on it. Pt states the pain/swelling started this past Wednesday 4/25 and twisted his knee on 4/22. Pt was sent here by Oncologist to R/O blood clot.

## 2018-04-29 NOTE — ED NOTES

## 2018-04-29 NOTE — ED PROVIDER NOTES
HPI Comments: 61 y.o. male with past medical history significant for myeloma and hypercholesterolemia who presents from home via private vehicle with chief complaint of knee pain. Pt reports gradual onset of worsening pain and swelling of the left knee and LLE. Most of the pain is located along the medial aspect of the left knee. Pain is to the point that he is now having trouble bearing weight on the leg. Pt notes that he had an injury 1 week to 10 days ago when he \"twisted\" his left knee -- swelling and pain started a couple of days later. Pt is currently undergoing chemotherapy treatment for myeloma and called his oncologist who referred him to the ED for US to rule out DVT. Pt denies fever, chills, nausea, vomiting, and having any other acute complaints. There are no other acute medical concerns at this time. PCP: Artie Ochoa MD    Note written by Delgado Douglas, as dictated by Natalie Stokes MD 7:25 PM      The history is provided by the patient. No  was used. Past Medical History:   Diagnosis Date    Hypercholesteremia     Myeloma ([facility]        Past Surgical History:   Procedure Laterality Date    CARDIAC SURG PROCEDURE UNLIST      EP study    HX CARPAL TUNNEL RELEASE Right     HX HEENT  2006    lasik eye surgery-bilateral    HX MENISCUS REPAIR           No family history on file. Social History     Social History    Marital status:      Spouse name: N/A    Number of children: N/A    Years of education: N/A     Occupational History    Not on file. Social History Main Topics    Smoking status: Never Smoker    Smokeless tobacco: Never Used    Alcohol use No    Drug use: No    Sexual activity: Not on file     Other Topics Concern    Not on file     Social History Narrative         ALLERGIES: Percocet [oxycodone-acetaminophen]    Review of Systems   Constitutional: Negative for activity change, appetite change and fatigue.    HENT: Negative for ear pain, facial swelling, sore throat and trouble swallowing. Eyes: Negative for pain, discharge and visual disturbance. Respiratory: Negative for chest tightness, shortness of breath and wheezing. Cardiovascular: Positive for leg swelling (LLE). Negative for chest pain and palpitations. Gastrointestinal: Negative for abdominal pain, blood in stool, nausea and vomiting. Genitourinary: Negative for difficulty urinating, flank pain and hematuria. Musculoskeletal: Negative for arthralgias, joint swelling, myalgias and neck pain. +Left LLE and left knee pain   Skin: Negative for color change and rash. Neurological: Negative for dizziness, weakness, numbness and headaches. Hematological: Negative for adenopathy. Does not bruise/bleed easily. Psychiatric/Behavioral: Negative for behavioral problems, confusion and sleep disturbance. All other systems reviewed and are negative. Vitals:    04/29/18 1622 04/29/18 1921   BP: 121/68 107/61   Pulse: 87 90   Resp: 16 16   Temp: 99.8 °F (37.7 °C)    SpO2: 97% 98%   Weight: 73.5 kg (162 lb)    Height: 5' 11\" (1.803 m)             Physical Exam   Constitutional: He is oriented to person, place, and time. He appears well-developed and well-nourished. He appears distressed (with pain in the left knee). HENT:   Head: Normocephalic and atraumatic. Nose: Nose normal.   Mouth/Throat: Oropharynx is clear and moist.   Eyes: Conjunctivae and EOM are normal. Pupils are equal, round, and reactive to light. No scleral icterus. Neck: Normal range of motion. Neck supple. No JVD present. No tracheal deviation present. No thyromegaly present. No carotid bruits noted. Cardiovascular: Normal rate, regular rhythm, normal heart sounds and intact distal pulses. Exam reveals no gallop and no friction rub. No murmur heard. Pulmonary/Chest: Effort normal and breath sounds normal. No respiratory distress. He has no wheezes. He has no rales.  He exhibits no tenderness. Abdominal: Soft. Bowel sounds are normal. He exhibits no distension and no mass. There is no tenderness. There is no rebound and no guarding. Musculoskeletal: Normal range of motion. He exhibits tenderness (Knee is moderately tender to palpation, especially medially. ). He exhibits no edema. Right knee: He exhibits effusion. Marked effusion of the left knee with TTP behind the joint. No calf tenderness. No erythema and no particular warmth to the joint     Lymphadenopathy:     He has no cervical adenopathy. Neurological: He is alert and oriented to person, place, and time. He has normal reflexes. No cranial nerve deficit. Coordination normal.   Skin: Skin is warm and dry. No rash noted. No erythema. Psychiatric: He has a normal mood and affect. His behavior is normal. Judgment and thought content normal.   Nursing note and vitals reviewed. Note written by Delgado Pro, as dictated by Terrance Wade MD 7:25 PM    MDM  Number of Diagnoses or Management Options     Amount and/or Complexity of Data Reviewed  Clinical lab tests: ordered and reviewed  Decide to obtain previous medical records or to obtain history from someone other than the patient: yes  Review and summarize past medical records: yes          ED Course       Arthrocentesis  Date/Time: 5/3/2018 8:38 AM  Performed by: Matilda Infante  Authorized by: Matilda Infante     Consent:     Consent obtained:  Verbal    Consent given by:  Patient    Risks discussed:  Bleeding and infection    Alternatives discussed:  Alternative treatment  Location:     Location:  Knee    Knee:  L knee  Anesthesia (see MAR for exact dosages):      Anesthesia method:  Local infiltration    Local anesthetic:  Lidocaine 1% w/o epi  Procedure details:     Preparation: Patient was prepped and draped in usual sterile fashion      Needle gauge:  18 G    Ultrasound guidance: no      Approach:  Medial    Aspirate characteristics: Yellow, cloudy and blood-tinged    Steroid injected: no      Specimen collected: yes    Post-procedure details:     Dressing:  Adhesive bandage    Patient tolerance of procedure: Tolerated well, no immediate complications  Comments:      35 cc yellow to slightly cloudy fluid removed without difficulty. Withdrew ~ 40 mL of yellowish fluid from the left knee effusion, using Xylocaine 1% for anesthetic - pt tolerated well. Fluids sent for cell counts, culture with gram stain, and uric acid. PROGRESS NOTE:  7:24 PM  Pt's fluids came back from the lab as normal. X-ray and doppler negative. Will d/c for symptomatic treatment. Non-specific effusion of left knee. Patient to follow up with own MD and minimize weight bearing on the left knee as needed for discomfort. Cool compresses for the first 2 days. Antiinflammatory agents for swelling and pain.  Cultures sent

## 2018-05-08 ENCOUNTER — HOSPITAL ENCOUNTER (OUTPATIENT)
Dept: INTERVENTIONAL RADIOLOGY/VASCULAR | Age: 63
Discharge: HOME | End: 2018-05-08
Attending: INTERNAL MEDICINE | Admitting: RADIOLOGY
Payer: COMMERCIAL

## 2018-05-08 VITALS — TEMPERATURE: 98.5 F | HEIGHT: 71 IN | WEIGHT: 158.29 LBS | BODY MASS INDEX: 22.16 KG/M2

## 2018-05-08 DIAGNOSIS — T82.598D OTHER MECHANICAL COMPLICATION OF OTHER CARDIAC AND VASCULAR DEVICES AND IMPLANTS, SUBSEQUENT ENCOUNTER: ICD-10-CM

## 2018-05-08 DIAGNOSIS — C90.00 MYELOMA ASSOCIATED AMYLOIDOSIS (HCC): ICD-10-CM

## 2018-05-08 DIAGNOSIS — E85.9 MYELOMA ASSOCIATED AMYLOIDOSIS (HCC): ICD-10-CM

## 2018-05-08 PROCEDURE — 74011250636 HC RX REV CODE- 250/636: Performed by: RADIOLOGY

## 2018-05-08 PROCEDURE — 36598 INJ W/FLUOR EVAL CV DEVICE: CPT

## 2018-05-08 PROCEDURE — 74011636320 HC RX REV CODE- 636/320

## 2018-05-08 PROCEDURE — 36593 DECLOT VASCULAR DEVICE: CPT

## 2018-05-08 RX ADMIN — ALTEPLASE 2 MG: 2.2 INJECTION, POWDER, LYOPHILIZED, FOR SOLUTION INTRAVENOUS at 10:52

## 2018-05-08 RX ADMIN — IOPAMIDOL 10 ML: 755 INJECTION, SOLUTION INTRAVENOUS at 12:00

## 2018-05-08 NOTE — PROGRESS NOTES
Patient arrived. ID and allergies verified verbally with patient. Pt voices understanding of procedure to be performed. Consent obtained. Pt prepped for procedure. 11:00,Port accessed,flushes w/o difficulty,but unable to withdraw blood. Cath-Kin injected into port as ordered by . 11:30,still unable to withdraw blood from port, ntfd. TRANSFER - OUT REPORT:    Verbal report given to VEL Loconame) on [facility]  being transferred to angio(unit) for routine progression of care       Report consisted of patients Situation, Background, Assessment and   Recommendations(SBAR). Information from the following report(s) Procedure Summary was reviewed with the receiving nurse. Lines:       Opportunity for questions and clarification was provided. Patient transported with:   Registered Nurse  12:10 TRANSFER - IN REPORT:    Verbal report received from VEL Loco(name) on [facility]  being received from angio(unit) for routine progression of care      Report consisted of patients Situation, Background, Assessment and   Recommendations(SBAR). Information from the following report(s) Procedure Summary was reviewed with the receiving nurse. Opportunity for questions and clarification was provided. Assessment completed upon patients arrival to unit and care assumed. Discharge instructions reviewed with patient and family. Voiced understanding. Patient given copy of discharge instructions to take home.

## 2018-05-08 NOTE — PROGRESS NOTES
TRANSFER - OUT REPORT:    Verbal report given to Chuck Holcomb at bedside on Ben Bustos  being transferred to [facility] for routine progression of care       Report consisted of patients Situation, Background, Assessment and   Recommendations(SBAR). Information from the following report(s) Procedure Summary was reviewed with the receiving nurse. Lines:       Opportunity for questions and clarification was provided.       Patient transported with:   Registered Nurse

## 2018-05-08 NOTE — ROUTINE PROCESS
Spoke with Dr Teresa Coe regarding Ju Gorman. Per MD, since port has not been accessed or flushed recently, will start with cath maylin and assess result prior to Ju Gorman. Order placed for TPA per MD

## 2018-05-13 LAB
BACTERIA SPEC CULT: NORMAL
BACTERIA SPEC CULT: NORMAL
GRAM STN SPEC: NORMAL
GRAM STN SPEC: NORMAL
SERVICE CMNT-IMP: NORMAL

## 2018-07-03 ENCOUNTER — HOSPITAL ENCOUNTER (OUTPATIENT)
Dept: PET IMAGING | Age: 63
Discharge: HOME | End: 2018-07-03
Attending: INTERNAL MEDICINE
Payer: COMMERCIAL

## 2018-07-03 VITALS — HEIGHT: 71 IN | BODY MASS INDEX: 22.68 KG/M2 | WEIGHT: 162 LBS

## 2018-07-03 DIAGNOSIS — C90.00 MYELOMA (HCC): ICD-10-CM

## 2018-07-03 PROCEDURE — A9552 F18 FDG: HCPCS

## 2018-07-03 RX ORDER — SODIUM CHLORIDE 0.9 % (FLUSH) 0.9 %
10 SYRINGE (ML) INJECTION
Status: COMPLETED | OUTPATIENT
Start: 2018-07-03 | End: 2018-07-03

## 2018-07-03 RX ADMIN — Medication 10 ML: at 13:43

## 2019-02-06 ENCOUNTER — HOSPITAL ENCOUNTER (OUTPATIENT)
Dept: PET IMAGING | Age: 64
Discharge: HOME | End: 2019-02-06
Attending: INTERNAL MEDICINE
Payer: COMMERCIAL

## 2019-02-06 VITALS — WEIGHT: 160 LBS | BODY MASS INDEX: 22.4 KG/M2 | HEIGHT: 71 IN

## 2019-02-06 DIAGNOSIS — E85.9 MYELOMA ASSOCIATED AMYLOIDOSIS (HCC): ICD-10-CM

## 2019-02-06 DIAGNOSIS — C90.00 MYELOMA ASSOCIATED AMYLOIDOSIS (HCC): ICD-10-CM

## 2019-02-06 PROCEDURE — A9552 F18 FDG: HCPCS

## 2019-02-06 RX ORDER — SODIUM CHLORIDE 0.9 % (FLUSH) 0.9 %
10 SYRINGE (ML) INJECTION
Status: COMPLETED | OUTPATIENT
Start: 2019-02-06 | End: 2019-02-06

## 2019-02-06 RX ADMIN — Medication 10 ML: at 14:38

## 2019-02-18 ENCOUNTER — HOSPITAL ENCOUNTER (INPATIENT)
Age: 64
LOS: 2 days | Discharge: HOME | DRG: 194 | End: 2019-02-20
Attending: EMERGENCY MEDICINE | Admitting: FAMILY MEDICINE
Payer: COMMERCIAL

## 2019-02-18 ENCOUNTER — APPOINTMENT (OUTPATIENT)
Dept: CT IMAGING | Age: 64
DRG: 194 | End: 2019-02-18
Attending: FAMILY MEDICINE
Payer: COMMERCIAL

## 2019-02-18 ENCOUNTER — APPOINTMENT (OUTPATIENT)
Dept: GENERAL RADIOLOGY | Age: 64
DRG: 194 | End: 2019-02-18
Attending: EMERGENCY MEDICINE
Payer: COMMERCIAL

## 2019-02-18 DIAGNOSIS — J10.1 INFLUENZA B: Primary | ICD-10-CM

## 2019-02-18 PROBLEM — J11.1 INFLUENZA: Status: ACTIVE | Noted: 2019-02-18

## 2019-02-18 LAB
ALBUMIN SERPL-MCNC: 3.2 G/DL (ref 3.5–5)
ALBUMIN/GLOB SERPL: 0.8 {RATIO} (ref 1.1–2.2)
ALP SERPL-CCNC: 55 U/L (ref 45–117)
ALT SERPL-CCNC: 17 U/L (ref 12–78)
ANION GAP SERPL CALC-SCNC: 7 MMOL/L (ref 5–15)
APPEARANCE UR: CLEAR
AST SERPL-CCNC: 19 U/L (ref 15–37)
BACTERIA URNS QL MICRO: NEGATIVE /HPF
BASOPHILS # BLD: 0.1 K/UL (ref 0–0.1)
BASOPHILS NFR BLD: 3 % (ref 0–1)
BILIRUB SERPL-MCNC: 0.3 MG/DL (ref 0.2–1)
BILIRUB UR QL: NEGATIVE
BUN SERPL-MCNC: 13 MG/DL (ref 6–20)
BUN/CREAT SERPL: 15 (ref 12–20)
CALCIUM SERPL-MCNC: 8.9 MG/DL (ref 8.5–10.1)
CHLORIDE SERPL-SCNC: 100 MMOL/L (ref 97–108)
CO2 SERPL-SCNC: 30 MMOL/L (ref 21–32)
COLOR UR: ABNORMAL
COMMENT, HOLDF: NORMAL
CREAT SERPL-MCNC: 0.87 MG/DL (ref 0.7–1.3)
DIFFERENTIAL METHOD BLD: ABNORMAL
EOSINOPHIL # BLD: 0 K/UL (ref 0–0.4)
EOSINOPHIL NFR BLD: 1 % (ref 0–7)
EPITH CASTS URNS QL MICRO: NORMAL /LPF
ERYTHROCYTE [DISTWIDTH] IN BLOOD BY AUTOMATED COUNT: 15.6 % (ref 11.5–14.5)
FLUAV AG NPH QL IA: NEGATIVE
FLUBV AG NOSE QL IA: POSITIVE
GLOBULIN SER CALC-MCNC: 4 G/DL (ref 2–4)
GLUCOSE SERPL-MCNC: 100 MG/DL (ref 65–100)
GLUCOSE UR STRIP.AUTO-MCNC: NEGATIVE MG/DL
HCT VFR BLD AUTO: 36.3 % (ref 36.6–50.3)
HGB BLD-MCNC: 11.5 G/DL (ref 12.1–17)
HGB UR QL STRIP: ABNORMAL
IMM GRANULOCYTES # BLD AUTO: 0 K/UL (ref 0–0.04)
IMM GRANULOCYTES NFR BLD AUTO: 1 % (ref 0–0.5)
KETONES UR QL STRIP.AUTO: NEGATIVE MG/DL
LACTATE BLD-SCNC: 1.37 MMOL/L (ref 0.4–2)
LEUKOCYTE ESTERASE UR QL STRIP.AUTO: NEGATIVE
LYMPHOCYTES # BLD: 0.4 K/UL (ref 0.8–3.5)
LYMPHOCYTES NFR BLD: 22 % (ref 12–49)
MCH RBC QN AUTO: 29.8 PG (ref 26–34)
MCHC RBC AUTO-ENTMCNC: 31.7 G/DL (ref 30–36.5)
MCV RBC AUTO: 94 FL (ref 80–99)
MONOCYTES # BLD: 0.5 K/UL (ref 0–1)
MONOCYTES NFR BLD: 26 % (ref 5–13)
NEUTS SEG # BLD: 1 K/UL (ref 1.8–8)
NEUTS SEG NFR BLD: 47 % (ref 32–75)
NITRITE UR QL STRIP.AUTO: NEGATIVE
NRBC # BLD: 0 K/UL (ref 0–0.01)
NRBC BLD-RTO: 0 PER 100 WBC
PH UR STRIP: 6.5 [PH] (ref 5–8)
PLATELET # BLD AUTO: 210 K/UL (ref 150–400)
PMV BLD AUTO: 10.2 FL (ref 8.9–12.9)
POTASSIUM SERPL-SCNC: 4 MMOL/L (ref 3.5–5.1)
PROT SERPL-MCNC: 7.2 G/DL (ref 6.4–8.2)
PROT UR STRIP-MCNC: 100 MG/DL
RBC # BLD AUTO: 3.86 M/UL (ref 4.1–5.7)
RBC #/AREA URNS HPF: NORMAL /HPF (ref 0–5)
RBC MORPH BLD: ABNORMAL
RBC MORPH BLD: ABNORMAL
SAMPLES BEING HELD,HOLD: NORMAL
SODIUM SERPL-SCNC: 137 MMOL/L (ref 136–145)
SP GR UR REFRACTOMETRY: 1.02 (ref 1–1.03)
UROBILINOGEN UR QL STRIP.AUTO: 0.2 EU/DL (ref 0.2–1)
WBC # BLD AUTO: 2 K/UL (ref 4.1–11.1)
WBC URNS QL MICRO: NORMAL /HPF (ref 0–4)

## 2019-02-18 PROCEDURE — 74011250636 HC RX REV CODE- 250/636: Performed by: FAMILY MEDICINE

## 2019-02-18 PROCEDURE — 96365 THER/PROPH/DIAG IV INF INIT: CPT

## 2019-02-18 PROCEDURE — 94761 N-INVAS EAR/PLS OXIMETRY MLT: CPT

## 2019-02-18 PROCEDURE — 80053 COMPREHEN METABOLIC PANEL: CPT

## 2019-02-18 PROCEDURE — 71046 X-RAY EXAM CHEST 2 VIEWS: CPT

## 2019-02-18 PROCEDURE — 74011250636 HC RX REV CODE- 250/636: Performed by: EMERGENCY MEDICINE

## 2019-02-18 PROCEDURE — 99218 HC RM OBSERVATION: CPT

## 2019-02-18 PROCEDURE — 83605 ASSAY OF LACTIC ACID: CPT

## 2019-02-18 PROCEDURE — 65660000000 HC RM CCU STEPDOWN

## 2019-02-18 PROCEDURE — 74011000258 HC RX REV CODE- 258: Performed by: EMERGENCY MEDICINE

## 2019-02-18 PROCEDURE — 87040 BLOOD CULTURE FOR BACTERIA: CPT

## 2019-02-18 PROCEDURE — 85025 COMPLETE CBC W/AUTO DIFF WBC: CPT

## 2019-02-18 PROCEDURE — 81001 URINALYSIS AUTO W/SCOPE: CPT

## 2019-02-18 PROCEDURE — 96366 THER/PROPH/DIAG IV INF ADDON: CPT

## 2019-02-18 PROCEDURE — 87804 INFLUENZA ASSAY W/OPTIC: CPT

## 2019-02-18 PROCEDURE — 87086 URINE CULTURE/COLONY COUNT: CPT

## 2019-02-18 PROCEDURE — 36415 COLL VENOUS BLD VENIPUNCTURE: CPT

## 2019-02-18 PROCEDURE — 74011000258 HC RX REV CODE- 258: Performed by: FAMILY MEDICINE

## 2019-02-18 PROCEDURE — 74011250637 HC RX REV CODE- 250/637: Performed by: EMERGENCY MEDICINE

## 2019-02-18 PROCEDURE — 99285 EMERGENCY DEPT VISIT HI MDM: CPT

## 2019-02-18 PROCEDURE — 71250 CT THORAX DX C-: CPT

## 2019-02-18 RX ORDER — FERROUS SULFATE, DRIED 160(50) MG
1 TABLET, EXTENDED RELEASE ORAL
COMMUNITY

## 2019-02-18 RX ORDER — LEVOFLOXACIN 5 MG/ML
750 INJECTION, SOLUTION INTRAVENOUS EVERY 24 HOURS
Status: DISCONTINUED | OUTPATIENT
Start: 2019-02-18 | End: 2019-02-20

## 2019-02-18 RX ORDER — BISACODYL 5 MG
5 TABLET, DELAYED RELEASE (ENTERIC COATED) ORAL EVERY OTHER DAY
COMMUNITY

## 2019-02-18 RX ORDER — FENTANYL 100 UG/H
1 PATCH TRANSDERMAL
Status: DISCONTINUED | OUTPATIENT
Start: 2019-02-18 | End: 2019-02-20 | Stop reason: HOSPADM

## 2019-02-18 RX ORDER — IPRATROPIUM BROMIDE AND ALBUTEROL SULFATE 2.5; .5 MG/3ML; MG/3ML
3 SOLUTION RESPIRATORY (INHALATION)
Status: DISCONTINUED | OUTPATIENT
Start: 2019-02-19 | End: 2019-02-20 | Stop reason: HOSPADM

## 2019-02-18 RX ORDER — ACETAMINOPHEN 500 MG
1000 TABLET ORAL
Status: COMPLETED | OUTPATIENT
Start: 2019-02-18 | End: 2019-02-18

## 2019-02-18 RX ORDER — THERA TABS 400 MCG
1 TAB ORAL
COMMUNITY

## 2019-02-18 RX ORDER — ALBUTEROL SULFATE 90 UG/1
2 AEROSOL, METERED RESPIRATORY (INHALATION)
COMMUNITY

## 2019-02-18 RX ADMIN — SODIUM CHLORIDE 1000 ML: 900 INJECTION, SOLUTION INTRAVENOUS at 17:35

## 2019-02-18 RX ADMIN — LEVOFLOXACIN 750 MG: 5 INJECTION, SOLUTION INTRAVENOUS at 23:18

## 2019-02-18 RX ADMIN — PIPERACILLIN SODIUM,TAZOBACTAM SODIUM 4.5 G: 4; .5 INJECTION, POWDER, FOR SOLUTION INTRAVENOUS at 17:36

## 2019-02-18 RX ADMIN — CEFEPIME HYDROCHLORIDE 2 G: 2 INJECTION, POWDER, FOR SOLUTION INTRAVENOUS at 22:31

## 2019-02-18 RX ADMIN — ACETAMINOPHEN 1000 MG: 500 TABLET ORAL at 17:25

## 2019-02-18 NOTE — ED TRIAGE NOTES
Quick Look: Patient comes to ER sent by Dr Roberta Montes c/o fever TMAX of 104 per wife. Pt has multiple myeloma.

## 2019-02-18 NOTE — ED PROVIDER NOTES
59 y.o. male with past medical history significant for myeloma and hypercholesterolemia who presents from Oncologist office via private vehicle, accompanied by wife, with chief complaint of fever. Patient was referred to ED by Oncologist for fever of 104. He also c/o cough, vomiting, nausea, sore throat, and confusion for past 3 days that are typical chemotherapy side effects. He is on the second round of 21 day chemotherapy, and notes he did not have fever with first round. Specifically denies shortness of breath, vomiting, diarrhea, congestion, rhinorrhea, urinary symptoms, and any other pain. There are no other acute medical concerns at this time. Social hx: Never tobacco smoker; Denies EtOH use; Denies illicit drug use PCP: Stef Gay MD 
 
Note written by Delgado Field, as dictated by Camila Mckinney MD 5:07 PM 
 
 
The history is provided by the spouse and the patient. No  was used. Past Medical History:  
Diagnosis Date  Hypercholesteremia  Myeloma ([facility] [address] Past Surgical History:  
Procedure Laterality Date  CARDIAC SURG PROCEDURE UNLIST    
 EP study  HX CARPAL TUNNEL RELEASE Right  HX HEENT  2006  
 lasik eye surgery-bilateral  
 HX MENISCUS REPAIR History reviewed. No pertinent family history. Social History Socioeconomic History  Marital status:  Spouse name: Not on file  Number of children: Not on file  Years of education: Not on file  Highest education level: Not on file Social Needs  Financial resource strain: Not on file  Food insecurity - worry: Not on file  Food insecurity - inability: Not on file  Transportation needs - medical: Not on file  Transportation needs - non-medical: Not on file Occupational History  Not on file Tobacco Use  Smoking status: Never Smoker  Smokeless tobacco: Never Used Substance and Sexual Activity  Alcohol use:  No  
  Drug use: No  
 Sexual activity: Not on file Other Topics Concern  Not on file Social History Narrative  Not on file ALLERGIES: Percocet [oxycodone-acetaminophen] Review of Systems Constitutional: Positive for fever. Negative for chills. HENT: Positive for sore throat. Negative for congestion. Respiratory: Positive for cough. Negative for shortness of breath. Cardiovascular: Negative for chest pain. Gastrointestinal: Positive for nausea. Negative for abdominal pain, constipation, diarrhea and vomiting. Genitourinary: Negative for dysuria and frequency. Neurological: Negative for dizziness and light-headedness. Psychiatric/Behavioral: Positive for confusion. All other systems reviewed and are negative. Vitals:  
 02/18/19 1520 02/18/19 1523 BP:  131/68 Pulse: 89 90 Resp:  18 Temp:  (!) 102.5 °F (39.2 °C) SpO2: 91% 91% Physical Exam  
Constitutional: He is oriented to person, place, and time. He appears well-developed and well-nourished. HENT:  
Head: Normocephalic and atraumatic. Eyes: No scleral icterus. Neck: Normal range of motion. Cardiovascular: Normal rate. Pulmonary/Chest: Effort normal.  
Abdominal: He exhibits no distension. Neurological: He is alert and oriented to person, place, and time. Skin: Skin is warm. No rash noted. No erythema. Pt feels warm. Nursing note and vitals reviewed. Note written by Delgado Barakat, as dictated by Honey Montana MD 5:07 PM 
 
MDM Procedures CONSULT NOTE: 
7:30 PM Honey Montana MD spoke with Dr. Roberta Montes, Consult for Oncology. Discussed available diagnostic tests and clinical findings. Dr. Roberta Montes recommends Hospitalist admission. He will see patient once admitted. Hospitalist Melany for Admission 8:33 PM 
 
ED Room Number: ER09/09 Patient Name and age: Rachelle Stroud 59 y.o.  male Working Diagnosis:  
1. Influenza B Readmission: no 
 Isolation Requirements:  yes Recommended Level of Care:  med/surg Code Status: Full Other:  Multiple myeloma on chemo

## 2019-02-19 LAB
ANION GAP SERPL CALC-SCNC: 7 MMOL/L (ref 5–15)
BASOPHILS # BLD: 0.1 K/UL (ref 0–0.1)
BASOPHILS NFR BLD: 4 % (ref 0–1)
BLOOD GROUP ANTIBODIES SERPL: NORMAL
BUN SERPL-MCNC: 15 MG/DL (ref 6–20)
BUN/CREAT SERPL: 21 (ref 12–20)
CALCIUM SERPL-MCNC: 7.8 MG/DL (ref 8.5–10.1)
CHLORIDE SERPL-SCNC: 107 MMOL/L (ref 97–108)
CO2 SERPL-SCNC: 27 MMOL/L (ref 21–32)
COMMENT, HOLDF: NORMAL
CREAT SERPL-MCNC: 0.73 MG/DL (ref 0.7–1.3)
DAT POLY-SP REAG RBC QL: NORMAL
DIFFERENTIAL METHOD BLD: ABNORMAL
EOSINOPHIL # BLD: 0 K/UL (ref 0–0.4)
EOSINOPHIL NFR BLD: 1 % (ref 0–7)
ERYTHROCYTE [DISTWIDTH] IN BLOOD BY AUTOMATED COUNT: 15.8 % (ref 11.5–14.5)
GLUCOSE SERPL-MCNC: 101 MG/DL (ref 65–100)
HCT VFR BLD AUTO: 33.7 % (ref 36.6–50.3)
HGB BLD-MCNC: 10.4 G/DL (ref 12.1–17)
IGA SERPL-MCNC: 204 MG/DL (ref 70–400)
IGG SERPL-MCNC: 1070 MG/DL (ref 700–1600)
IGM SERPL-MCNC: 23 MG/DL (ref 40–230)
IMM GRANULOCYTES # BLD AUTO: 0 K/UL
IMM GRANULOCYTES NFR BLD AUTO: 0 %
LDH SERPL L TO P-CCNC: 250 U/L (ref 85–241)
LYMPHOCYTES # BLD: 0.7 K/UL (ref 0.8–3.5)
LYMPHOCYTES NFR BLD: 35 % (ref 12–49)
MCH RBC QN AUTO: 29.3 PG (ref 26–34)
MCHC RBC AUTO-ENTMCNC: 30.9 G/DL (ref 30–36.5)
MCV RBC AUTO: 94.9 FL (ref 80–99)
MONOCYTES # BLD: 0.1 K/UL (ref 0–1)
MONOCYTES NFR BLD: 7 % (ref 5–13)
NEUTS BAND NFR BLD MANUAL: 5 % (ref 0–6)
NEUTS SEG # BLD: 1.2 K/UL (ref 1.8–8)
NEUTS SEG NFR BLD: 48 % (ref 32–75)
NRBC # BLD: 0 K/UL (ref 0–0.01)
NRBC BLD-RTO: 0 PER 100 WBC
PLATELET # BLD AUTO: 162 K/UL (ref 150–400)
PMV BLD AUTO: 10.2 FL (ref 8.9–12.9)
POTASSIUM SERPL-SCNC: 3.8 MMOL/L (ref 3.5–5.1)
RBC # BLD AUTO: 3.55 M/UL (ref 4.1–5.7)
RBC MORPH BLD: ABNORMAL
RBC MORPH BLD: ABNORMAL
RETICS # AUTO: 0.03 M/UL (ref 0.03–0.1)
RETICS/RBC NFR AUTO: 1 % (ref 0.7–2.1)
SAMPLES BEING HELD,HOLD: NORMAL
SODIUM SERPL-SCNC: 141 MMOL/L (ref 136–145)
WBC # BLD AUTO: 2.1 K/UL (ref 4.1–11.1)

## 2019-02-19 PROCEDURE — 77030029684 HC NEB SM VOL KT MONA -A

## 2019-02-19 PROCEDURE — 74011000250 HC RX REV CODE- 250: Performed by: FAMILY MEDICINE

## 2019-02-19 PROCEDURE — 80048 BASIC METABOLIC PNL TOTAL CA: CPT

## 2019-02-19 PROCEDURE — 74011250637 HC RX REV CODE- 250/637: Performed by: HOSPITALIST

## 2019-02-19 PROCEDURE — 94664 DEMO&/EVAL PT USE INHALER: CPT

## 2019-02-19 PROCEDURE — 65270000029 HC RM PRIVATE

## 2019-02-19 PROCEDURE — 85025 COMPLETE CBC W/AUTO DIFF WBC: CPT

## 2019-02-19 PROCEDURE — 94640 AIRWAY INHALATION TREATMENT: CPT

## 2019-02-19 PROCEDURE — 74011000258 HC RX REV CODE- 258: Performed by: FAMILY MEDICINE

## 2019-02-19 PROCEDURE — 74011250636 HC RX REV CODE- 250/636: Performed by: FAMILY MEDICINE

## 2019-02-19 PROCEDURE — 85045 AUTOMATED RETICULOCYTE COUNT: CPT

## 2019-02-19 PROCEDURE — 36415 COLL VENOUS BLD VENIPUNCTURE: CPT

## 2019-02-19 PROCEDURE — 74011250637 HC RX REV CODE- 250/637: Performed by: FAMILY MEDICINE

## 2019-02-19 PROCEDURE — 83615 LACTATE (LD) (LDH) ENZYME: CPT

## 2019-02-19 PROCEDURE — 82784 ASSAY IGA/IGD/IGG/IGM EACH: CPT

## 2019-02-19 PROCEDURE — 86880 COOMBS TEST DIRECT: CPT

## 2019-02-19 PROCEDURE — 94760 N-INVAS EAR/PLS OXIMETRY 1: CPT

## 2019-02-19 RX ORDER — SODIUM CHLORIDE 9 MG/ML
75 INJECTION, SOLUTION INTRAVENOUS CONTINUOUS
Status: DISCONTINUED | OUTPATIENT
Start: 2019-02-19 | End: 2019-02-20 | Stop reason: HOSPADM

## 2019-02-19 RX ORDER — ONDANSETRON 2 MG/ML
4 INJECTION INTRAMUSCULAR; INTRAVENOUS
Status: DISCONTINUED | OUTPATIENT
Start: 2019-02-19 | End: 2019-02-20 | Stop reason: HOSPADM

## 2019-02-19 RX ORDER — PANTOPRAZOLE SODIUM 40 MG/1
40 TABLET, DELAYED RELEASE ORAL
Status: DISCONTINUED | OUTPATIENT
Start: 2019-02-19 | End: 2019-02-20 | Stop reason: HOSPADM

## 2019-02-19 RX ORDER — CITALOPRAM 20 MG/1
40 TABLET, FILM COATED ORAL DAILY
Status: DISCONTINUED | OUTPATIENT
Start: 2019-02-19 | End: 2019-02-20 | Stop reason: HOSPADM

## 2019-02-19 RX ORDER — DOCUSATE SODIUM 100 MG/1
300 CAPSULE, LIQUID FILLED ORAL
Status: DISCONTINUED | OUTPATIENT
Start: 2019-02-19 | End: 2019-02-20 | Stop reason: HOSPADM

## 2019-02-19 RX ORDER — OSELTAMIVIR PHOSPHATE 75 MG/1
75 CAPSULE ORAL EVERY 12 HOURS
Status: DISCONTINUED | OUTPATIENT
Start: 2019-02-19 | End: 2019-02-20 | Stop reason: HOSPADM

## 2019-02-19 RX ORDER — HEPARIN SODIUM 5000 [USP'U]/ML
5000 INJECTION, SOLUTION INTRAVENOUS; SUBCUTANEOUS EVERY 8 HOURS
Status: DISCONTINUED | OUTPATIENT
Start: 2019-02-19 | End: 2019-02-20 | Stop reason: HOSPADM

## 2019-02-19 RX ORDER — METHYLPHENIDATE HYDROCHLORIDE 5 MG/1
5 TABLET ORAL 2 TIMES DAILY
Status: DISCONTINUED | OUTPATIENT
Start: 2019-02-19 | End: 2019-02-20 | Stop reason: HOSPADM

## 2019-02-19 RX ORDER — THERA TABS 400 MCG
1 TAB ORAL
Status: DISCONTINUED | OUTPATIENT
Start: 2019-02-19 | End: 2019-02-20 | Stop reason: HOSPADM

## 2019-02-19 RX ORDER — SODIUM CHLORIDE 0.9 % (FLUSH) 0.9 %
5-40 SYRINGE (ML) INJECTION AS NEEDED
Status: DISCONTINUED | OUTPATIENT
Start: 2019-02-19 | End: 2019-02-20 | Stop reason: HOSPADM

## 2019-02-19 RX ORDER — ESOMEPRAZOLE MAGNESIUM 40 MG/1
40 CAPSULE, DELAYED RELEASE ORAL
Status: DISCONTINUED | OUTPATIENT
Start: 2019-02-19 | End: 2019-02-19 | Stop reason: CLARIF

## 2019-02-19 RX ORDER — SODIUM CHLORIDE 0.9 % (FLUSH) 0.9 %
5-40 SYRINGE (ML) INJECTION EVERY 8 HOURS
Status: DISCONTINUED | OUTPATIENT
Start: 2019-02-19 | End: 2019-02-20 | Stop reason: HOSPADM

## 2019-02-19 RX ADMIN — SODIUM CHLORIDE 75 ML/HR: 900 INJECTION, SOLUTION INTRAVENOUS at 16:04

## 2019-02-19 RX ADMIN — LEVOFLOXACIN 750 MG: 5 INJECTION, SOLUTION INTRAVENOUS at 23:32

## 2019-02-19 RX ADMIN — IPRATROPIUM BROMIDE AND ALBUTEROL SULFATE 3 ML: .5; 3 SOLUTION RESPIRATORY (INHALATION) at 12:37

## 2019-02-19 RX ADMIN — PANTOPRAZOLE SODIUM 40 MG: 40 TABLET, DELAYED RELEASE ORAL at 07:09

## 2019-02-19 RX ADMIN — HEPARIN SODIUM 5000 UNITS: 5000 INJECTION INTRAVENOUS; SUBCUTANEOUS at 00:37

## 2019-02-19 RX ADMIN — HEPARIN SODIUM 5000 UNITS: 5000 INJECTION INTRAVENOUS; SUBCUTANEOUS at 08:53

## 2019-02-19 RX ADMIN — CEFEPIME HYDROCHLORIDE 2 G: 2 INJECTION, POWDER, FOR SOLUTION INTRAVENOUS at 16:11

## 2019-02-19 RX ADMIN — IPRATROPIUM BROMIDE AND ALBUTEROL SULFATE 3 ML: .5; 3 SOLUTION RESPIRATORY (INHALATION) at 00:41

## 2019-02-19 RX ADMIN — THERA TABS 1 TABLET: TAB at 21:09

## 2019-02-19 RX ADMIN — CEFEPIME HYDROCHLORIDE 2 G: 2 INJECTION, POWDER, FOR SOLUTION INTRAVENOUS at 22:39

## 2019-02-19 RX ADMIN — OSELTAMIVIR PHOSPHATE 75 MG: 75 CAPSULE ORAL at 21:09

## 2019-02-19 RX ADMIN — DOCUSATE SODIUM 300 MG: 100 CAPSULE, LIQUID FILLED ORAL at 00:35

## 2019-02-19 RX ADMIN — ONDANSETRON 4 MG: 2 INJECTION INTRAMUSCULAR; INTRAVENOUS at 00:56

## 2019-02-19 RX ADMIN — Medication 10 ML: at 07:09

## 2019-02-19 RX ADMIN — THERA TABS 1 TABLET: TAB at 00:35

## 2019-02-19 RX ADMIN — Medication 10 ML: at 00:25

## 2019-02-19 RX ADMIN — IPRATROPIUM BROMIDE AND ALBUTEROL SULFATE 3 ML: .5; 3 SOLUTION RESPIRATORY (INHALATION) at 20:44

## 2019-02-19 RX ADMIN — IPRATROPIUM BROMIDE AND ALBUTEROL SULFATE 3 ML: .5; 3 SOLUTION RESPIRATORY (INHALATION) at 07:55

## 2019-02-19 RX ADMIN — HEPARIN SODIUM 5000 UNITS: 5000 INJECTION INTRAVENOUS; SUBCUTANEOUS at 16:02

## 2019-02-19 RX ADMIN — IPRATROPIUM BROMIDE AND ALBUTEROL SULFATE 3 ML: .5; 3 SOLUTION RESPIRATORY (INHALATION) at 17:26

## 2019-02-19 RX ADMIN — OSELTAMIVIR PHOSPHATE 75 MG: 75 CAPSULE ORAL at 09:59

## 2019-02-19 RX ADMIN — DOCUSATE SODIUM 300 MG: 100 CAPSULE, LIQUID FILLED ORAL at 21:13

## 2019-02-19 RX ADMIN — SODIUM CHLORIDE 75 ML/HR: 900 INJECTION, SOLUTION INTRAVENOUS at 00:37

## 2019-02-19 RX ADMIN — Medication 10 ML: at 21:10

## 2019-02-19 RX ADMIN — IPRATROPIUM BROMIDE AND ALBUTEROL SULFATE 3 ML: .5; 3 SOLUTION RESPIRATORY (INHALATION) at 04:30

## 2019-02-19 RX ADMIN — CITALOPRAM HYDROBROMIDE 40 MG: 20 TABLET ORAL at 08:52

## 2019-02-19 RX ADMIN — CEFEPIME HYDROCHLORIDE 2 G: 2 INJECTION, POWDER, FOR SOLUTION INTRAVENOUS at 07:12

## 2019-02-19 NOTE — PROGRESS NOTES
Methylphenidate 5 mg mg BID at  9 am 2pm 
Fentanyl 100 mcg/hr patch 1 patch q2d Omeprazole 40 1 every day Proair 1 puff q4h prn SOB/wheezing Zofran 8 mg q8h prn N/V Citalopram 40 mg every day Got in contact with patient's pharmacy who reported the above medications. Will alert Dr. Brendan Ybarra.

## 2019-02-19 NOTE — PROGRESS NOTES
TRANSFER - IN REPORT: 
 
Verbal report received from VEL Verdugo(name) on Orquidea Valencia  being received from ED(unit) for routine progression of care Report consisted of patients Situation, Background, Assessment and  
Recommendations(SBAR). Information from the following report(s) SBAR, ED Summary, [facility] and Recent Results was reviewed with the receiving nurse. Opportunity for questions and clarification was provided. Assessment completed upon patients arrival to unit and care assumed.

## 2019-02-19 NOTE — ROUTINE PROCESS
TRANSFER - OUT REPORT: 
 
Verbal report given to Sonal(name) on Brooklyn Balderas  being transferred to (unit) for routine progression of care Report consisted of patients Situation, Background, Assessment and  
Recommendations(SBAR). Information from the following report(s) SBAR and ED Summary was reviewed with the receiving nurse. Lines:  
Peripheral IV 02/18/19 Left Hand (Active) Site Assessment Clean, dry, & intact 2/18/2019  5:13 PM  
Phlebitis Assessment 0 2/18/2019  5:13 PM  
Infiltration Assessment 0 2/18/2019  5:13 PM  
Dressing Status Clean, dry, & intact 2/18/2019  5:13 PM  
Dressing Type Transparent 2/18/2019  5:13 PM  
Hub Color/Line Status Pink;Capped;Flushed;Patent 2/18/2019  5:13 PM  
Action Taken Blood drawn 2/18/2019  5:13 PM  
   
Peripheral IV 02/18/19 Right Hand (Active) Site Assessment Clean, dry, & intact 2/18/2019  3:50 PM  
Phlebitis Assessment 0 2/18/2019  3:50 PM  
Infiltration Assessment 0 2/18/2019  3:50 PM  
Dressing Status Clean, dry, & intact 2/18/2019  3:50 PM  
Dressing Type Transparent 2/18/2019  3:50 PM  
Hub Color/Line Status Pink;Capped;Flushed;Patent 2/18/2019  3:50 PM  
Action Taken Blood drawn 2/18/2019  3:50 PM  
  
 
Opportunity for questions and clarification was provided. Patient transported with: 
 Registered Nurse

## 2019-02-19 NOTE — H&P
[address] [facility] HISTORY AND PHYSICAL Name:  Alex Jackson 
MR#:  [id number] :  1955 ACCOUNT #:  [de-identified] ADMIT DATE:  2019 CHIEF COMPLAINT:  Fevers. HISTORY OF PRESENT ILLNESS:  The patient is a 70-year-old gentleman with past medical 
history of multiple myeloma, hypercholesteremia, history of arthritis, sepsis, _____, 
dehydration, and abdominal pain, status post stem cell transplant x2 who presents to 
the hospital with the above-mentioned symptoms. The patient reports that for the 
past 2 to 3 days, he has been having increasing body aches associated with low-grade 
fevers. He went to his oncologist today, found to have a fever of 104 degrees Fahrenheit and was brought to the hospital.  The patient also reports that he has 
been having some mild cough associated with vomiting, nausea, sore throat, and 
weakness. The wife who is present at the bedside reports that the patient had not 
had any changes with his mental status, but he just feels weak and feels like he 
wants to sleep all day. The patient reports that he is on oral chemotherapy and 
takes 21 days of chemotherapy, stops for 7 days and takes it again for 21 days. The 
patient was brought to the ER, was found to be positive for influenza and was 
requested to be observed under the Hospitalist Service. The patient denies any 
headache, blurry vision, sore throat, trouble swallowing, trouble with speech, chest 
pain, abdominal pain, constipation, diarrhea, urinary symptoms, any focal or 
generalized neurological weakness, recent travel, sick contacts, falls, injuries, 
hematemesis, melena, hemoptysis, hematuria or any other concerns or problems. PAST MEDICAL HISTORY:  See above. HOME MEDICATIONS:  Currently, the patient is on: 1. Pomalyst one capsule nightly for 21 days and off for 7 days. 2.  Fentanyl 100 microgram patch. 3.  Albuterol.  
4.  Calcium with vitamin D. 
 5.  Therapeutic multivitamin. 6.  Dulcolax. 7.  Methylphenidate. 8.  Celexa 40 mg daily. 9.  Esomeprazole 40 mg daily. 10.  Loratadine 10 mg daily. 11.  Colace 300 mg nightly. 12.  Hydromorphone 2 mg q.6 hours as needed. SOCIAL HISTORY:  The patient ambulates independently without assistance. Denies 
alcohol use, IV drug abuse and tobacco abuse. Lives at home. REVIEW OF SYSTEMS:  All systems were reviewed and were found to be essentially 
negative except for the symptoms as mentioned above. FAMILY HISTORY:  Discussed and found to be noncontributory to the present admission. PHYSICAL EXAMINATION: 
VITAL SIGNS:  Temperature 101.3, pulse 75, respiratory rate 16, blood pressure 113/53, pulse oximetry 94% on room air. GENERAL:  Alert x3, awake, mildly distress, pleasant male, appears to be stated age. HEENT:  Pupils equal and reactive to light. Dry mucous membranes. Tympanic membrane 
is clear. NECK:  Supple. CHEST:  Scattered wheezes. COR:  S1 and S2 heard. ABDOMEN:  Soft, nontender, nondistended. Bowel sounds are physiological. 
EXTREMITIES:  No clubbing, no cyanosis, no edema. NEURO/PSYCH:  Pleasant, normal affect. Cranial nerves II through XII grossly intact. Sensory grossly within normal limits. DTRs 2+/4, strength 5/5. SKIN:  Warm. LABORATORY DATA:  CBC:  White count 2.0, hemoglobin 11.5, hematocrit 36.3, platelets 210, neutrophils 47%, basophils 3, absolute neutrophils of 1. Urine shows no signs 
of infection. Chemistry:  Sodium 137, potassium 4.0, chloride 100, bicarb 30, anion 
gap 7, glucose 100, BUN 13, creatinine 0.87, calcium 8.9, bilirubin total 0.3, ALT 
17, AST 19, alkaline phosphatase 55. Blood cultures are pending. Influenza A is 
negative. Influenza B is positive. X-ray of the chest shows no acute abnormality. ASSESSMENT AND PLAN: 
1. Influenza.   The patient will be admitted on a telemetry bed, start the patient on 
 supportive care, IV hydration, pain control, DuoNeb, and further intervention per 
hospital course. Start patient on Tamiflu and put patient on droplet precautions and 
further intervention per hospital course. Continue to closely monitor. Reassess as 
needed. 2.  Neutropenic fever. We will start patient on broad-spectrum IV antibiotics, IV 
fluids. Blood cultures were drawn in the ER, and we will await their result. We 
will also get urine culture and CT of the chest to rule out any acute infection and 
further intervention per hospital course. If neutropenia persists, may consider 
further intervention and diagnostics. We will also consult Oncology and seek their 
recommendations. Further intervention per hospital course. Continue to monitor. 3.  History of multiple myeloma. The patient is on day 7 in his 21-day cycle. He 
did not take his medication today. We will consult Oncology in the morning to see if 
he needs to continue on his oral chemotherapeutic regimen. Supportive care. Continue to monitor. IV hydration. 4.  Dehydration. The patient appears to be mildly dehydrated. We will start the 
patient on gentle IV hydration and repeat labs and continue to closely monitor. 5.  Gastrointestinal and deep venous thrombosis prophylaxis. The patient will be on 
heparin. Chay Mcwilliams MD 
 
 
[id number] 
D:  02/18/2019 21:38 
T:  02/19/2019 6:01 
JOB #:  [id number]

## 2019-02-19 NOTE — PROGRESS NOTES
Admission Medication Reconciliation: 
 
Information obtained from: This medication history was obtained from the patient's wife. She appears to be an adequate historian. She could not recall the dose of hydromorphone or pomalidomide. An RX Query is not available. I attempted to verify doses and refill history for fentanyl , hydromorphone, and methylphenidate. [facility] shows no opioid prescriptions since December 2017 and no methylphenidate since May 2018. It is atypical for this data to not be reported. They report using [facility] in [place] at [phone]. It is closed for the day. I recommend further follow-up tomorrow. Summary:  
 
Medications added: hydromorphone, methylphenidate, pomalidomide Medications deleted: acyclovir, aspirin, fluticasone nasal spray, senna, tamsulosin Inpatient orders were reviewed and no changes are needed. Chief Complaint for this Admission:  fever Significant PMH/Disease States:  
Past Medical History:  
Diagnosis Date  Hypercholesteremia  Myeloma ([facility] [address] Allergies:  Percocet [oxycodone-acetaminophen] Prior to Admission Medications:  
Prior to Admission Medications Prescriptions Last Dose Informant Patient Reported? Taking? albuterol (PROVENTIL HFA, VENTOLIN HFA, PROAIR HFA) 90 mcg/actuation inhaler 2/18/2019  Yes Yes Sig: Take 2 Puffs by inhalation every four (4) hours as needed for Wheezing. bisacodyl (DULCOLAX, BISACODYL,) 5 mg EC tablet 2/17/2019  Yes Yes Sig: Take 5 mg by mouth every other day. calcium-vitamin D (OYSTER SHELL) 500 mg(1,250mg) -200 unit per tablet 2/17/2019  Yes Yes Sig: Take 1 Tab by mouth nightly. citalopram (CELEXA) 40 mg tablet 2/17/2019  Yes Yes Sig: Take 40 mg by mouth daily. docusate sodium (STOOL SOFTENER) 100 mg capsule 2/17/2019  Yes Yes Sig: Take 300 mg by mouth nightly. esomeprazole (NEXIUM) 40 mg capsule 2/17/2019  Yes Yes Sig: Take 40 mg by mouth Daily (before breakfast). fentaNYL (DURAGESIC) 100 mcg/hr PATCH 2019 at 20:00  Yes Yes Si Patch by TransDERmal route every fourty-eight (48) hours. hydromorphone HCl (HYDROMORPHONE PO)   Yes Yes Sig: Take  by mouth every six (6) hours as needed for Pain.  
loratadine (CLARITIN) 10 mg tablet 2019  Yes Yes Sig: Take 10 mg by mouth daily. pomalidomide (POMALYST PO) 2019  Yes Yes Sig: Take 1 Cap by mouth nightly. (21 days on; 7 days off. Current cycle started on 19)  
therapeutic multivitamin (THERA) tablet 2019  Yes Yes Sig: Take 1 Tab by mouth nightly. Facility-Administered Medications: None Thank you for allowing me to participate in the care of this patient. Please contact the pharmacy () or the medication reconciliation pharmacist () with any questions. Eduarda Fernandes, Pharm. D., BCPS, BCPPS

## 2019-02-19 NOTE — PROGRESS NOTES
Problem: Risk for Spread of Infection Goal: Prevent transmission of infectious organism to others Prevent the transmission of infectious organisms to other patients, staff members, and visitors. Outcome: Progressing Towards Goal 
Hand hygiene, wear mask when ambulating out of room

## 2019-02-19 NOTE — CONSULTS
[address]    Name:  Estefani Byrd  MR#:  [id number]  :  1955  ACCOUNT #:  [de-identified]  DATE OF SERVICE:  2019      HISTORY OF PRESENT ILLNESS:  The patient is a 78-year-old gentleman with a history of  multiple myeloma who is seen after admission at [facility] with fever. This  gentleman is currently in remission after having had a stem cell transplant for  multiple myeloma in 2016. He is currently on maintenance Pomalyst.  He developed  fatigue over the weekend and then fever to 104 degrees on the day of admission. He  had no localizing symptoms. His wife noted that he was confused when his temperature  was elevated. The patient does have occasional headaches but these are chronic and  did not appear to be worse associated with these symptoms. He denies dysuria,  cellulitic-type symptoms, cough, or sinus type problems. He states that there are  sick relatives living in the home with viral-type symptoms. PAST MEDICAL HISTORY:  Significant for multiple myeloma diagnosed in early   treated with induction therapy followed by stem cell transplant. He had radiation  therapy to the sacrum in 2016. He has a history of coronary artery disease and has  had two coronary stents. He has  a history of arthritis, GERD, migraine headaches,  hypercholesterolemia, and depression. PAST SURGICAL HISTORY:  Includes cardiac catheterization, colonoscopy, prostate  biopsy, arthroscopic knee surgery, carpal tunnel release on the right. SOCIAL HISTORY:  He is . He works at [employer] in the [employer]. He does not smoke or drink. ALLERGIES:  TO PERCOCET. FAMILY HISTORY:  Grandmother had cancer of unknown type. His father had prostate  cancer. REVIEW OF SYSTEMS:  As noted above, otherwise noncontributory. PHYSICAL EXAMINATION:  GENERAL:  He is a pleasant, well-developed, well-nourished male in no apparent  distress.   VITAL SIGNS:  Temperature this morning 98.7, pulse 70, /66, respirations 15, O2  sat 96% on room air. HEENT:  EOMI, nonicteric sclerae. NECK:  Supple. Oropharynx without lesions. He has no palpable peripheral  lymphadenopathy. LUNGS:  Reveal rhonchi on the right which clear. CARDIAC:  Regular rate and rhythm. No murmur. He has a Port-A-Cath in place in the  right infraclavicular region without tenderness, swelling, or erythema. ABDOMEN:  Soft, nontender. No hepatosplenomegaly noted. EXTREMITIES:  No clubbing, cyanosis, or edema. NEUROLOGIC:  AO x3, nonfocal.  SKIN:  No petechiae or bruising. LABORATORY DATA:  White count 2.1, hemoglobin 10.4, platelet count 990, BUN and  creatinine are normal.  Albumin 3.2. CT of the chest performed 02/18 demonstrates no  infiltrate, no acute process. Widespread osseous metastatic disease is stable. IMPRESSION:  1. Fever in a gentleman with multiple myeloma which is currently in remission. Differential diagnosis includes bacterial infection with possible Port-A-Cath source  versus septicemia from other causes, viral etiology seemed to be at least likely. Opportunistic infections do not generally occur in this patient population at this  stage of their disease course. 2.  Myeloma. Mr. Surya García was due for restaging bone marrow biopsy today actually in  our office but we will delay that. He had a PET scan performed on 02/06 for  restaging purposes which was negative for abnormal FTG activity. His paraproteins in  our office have been stable indicating remission status. For the time-being while he  is in the hospital, we will hold the Pomalyst because of his cytopenias. 3.  Anemia and leukopenia. The patient has had borderline leukopenia since his bone  marrow transplant. The Pomalyst contributes to this.   His anemia is likewise present  and has been so since his transplant, though his hemoglobin is slightly lower than  where he has been trending in our office. I will evaluate the anemia further with  stool guaiac study, retake LDH and Neal. We will continue the Protonix which was  initiated last night. With regard to his infection, he is currently receiving  broad-spectrum antibiotics with Maxipime and is also on levofloxacin daily. I would  like Infectious Disease to see this patient during this hospital stay and will  consult Dr. Feliberto Hagen. 4.  DVT prophylaxis. For now, he is receiving heparin injections. We will need to  watch his platelet count and watch for any other complications. We will follow with  you on behalf of [employer].       Kai Lane MD      [id number]  D:  02/19/2019 10:33  T:  02/19/2019 12:09  JOB #:  [id number]

## 2019-02-19 NOTE — PROGRESS NOTES
Bedside shift change report given to Ivonne Rodriguez RN (oncoming nurse) by Liberty Akers RN (offgoing nurse). Report included the following information SBAR, Intake/Output, MAR, Recent Results and Cardiac Rhythm NSR.

## 2019-02-19 NOTE — PROGRESS NOTES
Reason for Admission: Influenza, h/o Multiple Myeloma, hypercholesteremia. Sepsis, s/p Stem cell transplant. RRAT Score:  3/ low Plan for utilizing home health:  None at this time. Pt has not used home health in past. He has used outpt rehab at [facility]. Likelihood of Readmission:  Low Transition of Care Plan: Pt to return home and f/u with PCP and or Dr Marcia Nicole, Oncologist. 
 
Care Management Interventions PCP Verified by CM: Yes(Tavares Bonds H. III, has not seen in a while. He sees Dr Sunshine Browne, Oncologist) Palliative Care Criteria Met (RRAT>21 & CHF Dx)?: No(RRAT score 3/ low) Mode of Transport at Discharge: Other (see comment)(spouse) Current Support Network: Lives with [address] Geiger, [phone]) Confirm Follow Up Transport: Self Plan discussed with Pt/Family/Caregiver: Yes Freedom of Choice Offered: Yes Discharge Location Discharge Placement: Home Kavon Enrique RN ACM CRM

## 2019-02-20 VITALS
SYSTOLIC BLOOD PRESSURE: 124 MMHG | HEIGHT: 71 IN | TEMPERATURE: 99.5 F | HEART RATE: 73 BPM | WEIGHT: 159.39 LBS | DIASTOLIC BLOOD PRESSURE: 63 MMHG | BODY MASS INDEX: 22.31 KG/M2 | RESPIRATION RATE: 18 BRPM | OXYGEN SATURATION: 96 %

## 2019-02-20 LAB
ALBUMIN SERPL-MCNC: 2.5 G/DL (ref 3.5–5)
ALBUMIN/GLOB SERPL: 0.7 {RATIO} (ref 1.1–2.2)
ALP SERPL-CCNC: 41 U/L (ref 45–117)
ALT SERPL-CCNC: 15 U/L (ref 12–78)
ANION GAP SERPL CALC-SCNC: 6 MMOL/L (ref 5–15)
AST SERPL-CCNC: 21 U/L (ref 15–37)
BACTERIA SPEC CULT: NORMAL
BILIRUB SERPL-MCNC: 0.3 MG/DL (ref 0.2–1)
BUN SERPL-MCNC: 10 MG/DL (ref 6–20)
BUN/CREAT SERPL: 16 (ref 12–20)
CALCIUM SERPL-MCNC: 7.5 MG/DL (ref 8.5–10.1)
CC UR VC: NORMAL
CHLORIDE SERPL-SCNC: 106 MMOL/L (ref 97–108)
CO2 SERPL-SCNC: 28 MMOL/L (ref 21–32)
CREAT SERPL-MCNC: 0.61 MG/DL (ref 0.7–1.3)
ERYTHROCYTE [DISTWIDTH] IN BLOOD BY AUTOMATED COUNT: 15.7 % (ref 11.5–14.5)
GLOBULIN SER CALC-MCNC: 3.6 G/DL (ref 2–4)
GLUCOSE SERPL-MCNC: 83 MG/DL (ref 65–100)
HCT VFR BLD AUTO: 31 % (ref 36.6–50.3)
HEMOCCULT STL QL: NEGATIVE
HGB BLD-MCNC: 9.9 G/DL (ref 12.1–17)
MCH RBC QN AUTO: 29.7 PG (ref 26–34)
MCHC RBC AUTO-ENTMCNC: 31.9 G/DL (ref 30–36.5)
MCV RBC AUTO: 93.1 FL (ref 80–99)
NRBC # BLD: 0 K/UL (ref 0–0.01)
NRBC BLD-RTO: 0 PER 100 WBC
PLATELET # BLD AUTO: 157 K/UL (ref 150–400)
PMV BLD AUTO: 10.1 FL (ref 8.9–12.9)
POTASSIUM SERPL-SCNC: 3.4 MMOL/L (ref 3.5–5.1)
PROT SERPL-MCNC: 6.1 G/DL (ref 6.4–8.2)
RBC # BLD AUTO: 3.33 M/UL (ref 4.1–5.7)
SERVICE CMNT-IMP: NORMAL
SODIUM SERPL-SCNC: 140 MMOL/L (ref 136–145)
WBC # BLD AUTO: 1.9 K/UL (ref 4.1–11.1)

## 2019-02-20 PROCEDURE — 74011250636 HC RX REV CODE- 250/636: Performed by: FAMILY MEDICINE

## 2019-02-20 PROCEDURE — 36415 COLL VENOUS BLD VENIPUNCTURE: CPT

## 2019-02-20 PROCEDURE — 74011250637 HC RX REV CODE- 250/637: Performed by: HOSPITALIST

## 2019-02-20 PROCEDURE — 84165 PROTEIN E-PHORESIS SERUM: CPT

## 2019-02-20 PROCEDURE — 74011250637 HC RX REV CODE- 250/637: Performed by: FAMILY MEDICINE

## 2019-02-20 PROCEDURE — 80053 COMPREHEN METABOLIC PANEL: CPT

## 2019-02-20 PROCEDURE — 85027 COMPLETE CBC AUTOMATED: CPT

## 2019-02-20 PROCEDURE — 83883 ASSAY NEPHELOMETRY NOT SPEC: CPT

## 2019-02-20 PROCEDURE — 94760 N-INVAS EAR/PLS OXIMETRY 1: CPT

## 2019-02-20 PROCEDURE — 74011000250 HC RX REV CODE- 250: Performed by: FAMILY MEDICINE

## 2019-02-20 PROCEDURE — 77030029684 HC NEB SM VOL KT MONA -A

## 2019-02-20 PROCEDURE — 94640 AIRWAY INHALATION TREATMENT: CPT

## 2019-02-20 PROCEDURE — 82272 OCCULT BLD FECES 1-3 TESTS: CPT

## 2019-02-20 PROCEDURE — 74011000258 HC RX REV CODE- 258: Performed by: FAMILY MEDICINE

## 2019-02-20 RX ORDER — OSELTAMIVIR PHOSPHATE 75 MG/1
75 CAPSULE ORAL EVERY 12 HOURS
Qty: 8 CAP | Refills: 0 | Status: SHIPPED | OUTPATIENT
Start: 2019-02-20 | End: 2019-02-24

## 2019-02-20 RX ADMIN — Medication 10 ML: at 06:38

## 2019-02-20 RX ADMIN — OSELTAMIVIR PHOSPHATE 75 MG: 75 CAPSULE ORAL at 09:02

## 2019-02-20 RX ADMIN — SODIUM CHLORIDE 75 ML/HR: 900 INJECTION, SOLUTION INTRAVENOUS at 09:44

## 2019-02-20 RX ADMIN — IPRATROPIUM BROMIDE AND ALBUTEROL SULFATE 3 ML: .5; 3 SOLUTION RESPIRATORY (INHALATION) at 00:31

## 2019-02-20 RX ADMIN — ONDANSETRON 4 MG: 2 INJECTION INTRAMUSCULAR; INTRAVENOUS at 09:42

## 2019-02-20 RX ADMIN — HEPARIN SODIUM 5000 UNITS: 5000 INJECTION INTRAVENOUS; SUBCUTANEOUS at 08:54

## 2019-02-20 RX ADMIN — IPRATROPIUM BROMIDE AND ALBUTEROL SULFATE 3 ML: .5; 3 SOLUTION RESPIRATORY (INHALATION) at 12:28

## 2019-02-20 RX ADMIN — IPRATROPIUM BROMIDE AND ALBUTEROL SULFATE 3 ML: .5; 3 SOLUTION RESPIRATORY (INHALATION) at 07:58

## 2019-02-20 RX ADMIN — HEPARIN SODIUM 5000 UNITS: 5000 INJECTION INTRAVENOUS; SUBCUTANEOUS at 00:35

## 2019-02-20 RX ADMIN — CEFEPIME HYDROCHLORIDE 2 G: 2 INJECTION, POWDER, FOR SOLUTION INTRAVENOUS at 06:38

## 2019-02-20 RX ADMIN — ONDANSETRON 4 MG: 2 INJECTION INTRAMUSCULAR; INTRAVENOUS at 03:59

## 2019-02-20 RX ADMIN — PANTOPRAZOLE SODIUM 40 MG: 40 TABLET, DELAYED RELEASE ORAL at 06:37

## 2019-02-20 RX ADMIN — CITALOPRAM HYDROBROMIDE 40 MG: 20 TABLET ORAL at 08:55

## 2019-02-20 NOTE — PROGRESS NOTES
CC: 59 y.o patient of Beatriz Mccann/Johnna with multiple myeloma s/p tandem autologous transplant referred for evaluation of myeloma/fever. HPI: 
Multiple myeloma s/p transplant 2016 Maintained on pomalidamide post transplant 2/18/2019 Admitted to [facility] with fever. Pancultures/antibiotics initiated. ROS: 
Feels better \"they have not used my double lumen portacath\" No localizing symptoms GENERAL:No distress 
distress. VSS HEENT:  EOMI, nonicteric sclerae. NECK:  No adenopathy noted Lung: good breath sounds CARDIAC:  RRR Chest:Port-A-Cath in place ABDOMEN:  Soft, nontender. No hepatosplenomegaly EXTREMITIES:  No clubbing, cyanosis, or edema. NEUROLOGIC:  AO x3, nonfocal. 
SKIN:  No petechiae or bruising. 
  
 
Dx: Fever 
--source unclear 
--always concerned with portacath in place Myeloma --immunoglobulins stable Portacath 
--double lumen catheter does not provide for blood draws but flushes easily 
--safe to access and use as needed Anemia --chronic 
--stable Rx: MAY USE PORTACATH as needed for hydration/medications Antibiotics per ID Transfuse prn Hold pomalidamide ? ? Remove port ? ? 
--defer to ID Disc with pt/wife: 
No evidence of recurrent myeloma at this time. Portacath has been erratic for access for long time. We are okay with accessing and using portacath as needed We are also okay with portacath removal if this will help infection risk --He has adequate peripheral venous access Our thanks to hospitalist  team

## 2019-02-20 NOTE — PROGRESS NOTES
Bedside and Verbal shift change report given to Kia Guerra (oncoming nurse) by Roberto Jarvis (offgoing nurse). Report included the following information SBAR and Kardex. Name band;

## 2019-02-20 NOTE — PROGRESS NOTES
Hospitalist Progress Note Carli Simeon MD 
Answering service: [phone] OR [phone] from in house phone Date of Service:  2019 NAME:  Otto Hirsch :  1955 MRN:  [id number] Admission Summary:  
59 y.o M with PMH of multiple myeloma on chemotherapy and history of sten cell transplant came to the hospital due to fever. Was found to have Flu. Interval history / Subjective:  
  
F/u for flu. Patient states that he is feeling better today, headaches has decreased. Breathing better. Assessment & Plan: #Influenza B: 
-on tamiflu. 
-encouraged intake of fluids. #Fever: 
- likely due to viral illness influenza B 
-he was started on empiric abx cefepime and levaquin at admission as he had low white count 
-blood cultures so far negative. Chemo port site looks clean and dry. -ID consulted. #Multiple myeloma # Bicytopenia(anemia and low white count):likely from chemo vs multiple myeloma 
-oncology consulted. #Chronic pain syndrome with underlying multiple myeloma: 
-on fentanyl patch 100 mcg ordered. 
-will use dilaudid -home med if break through pain # History of ?? ADHD: on riltalin 5 mg BID. Code status: full DVT prophylaxis: SCD Care Plan discussed with:patient and his wife at bed side Disposition: home when stable 2-3 days Hospital Problems  Date Reviewed: 2019 Codes Class Noted POA * (Principal) Influenza ICD-10-CM: J11.1 ICD-9-CM: 487.1  2019 Unknown Review of Systems: As per HPI Vital Signs:  
 Last 24hrs VS reviewed since prior progress note. Most recent are: 
Visit Vitals /62 (BP 1 Location: Right arm, BP Patient Position: At rest) Pulse 72 Temp 99 °F (37.2 °C) Resp 18 Wt 73.7 kg (162 lb 7.7 oz) SpO2 94% BMI 22.66 kg/m² Intake/Output Summary (Last 24 hours) at 2019 5128 Last data filed at 2019 0244 Gross per 24 hour Intake 2441.25 ml Output  Net 2441.25 ml Physical Examination:  
 
 
     
Constitutional:  No acute distress, cooperative, pleasant   
ENT:  Oral mucous moist, oropharynx benign. Neck supple, Resp:  CTA bilaterally. No wheezing/rhonchi/rales. No accessory muscle use CV:  Regular rhythm, normal rate, no murmurs, gallops, rubs GI:  Soft, non distended, non tender. normoactive bowel sounds, no hepatosplenomegaly Musculoskeletal:  No edema, warm, 2+ pulses throughout Neurologic:  Moves all extremities. AAOx3, CN II-XII reviewed Psych:  Good insight, Not anxious nor agitated. Data Review:  
 Review and/or order of clinical lab test 
Review and/or order of tests in the radiology section of CPT Review and/or order of tests in the medicine section of CPT Labs:  
 
Recent Labs  
  02/19/19 
0505 02/18/19 
1546 WBC 2.1* 2.0*  
HGB 10.4* 11.5* HCT 33.7* 36.3*  
 210 Recent Labs  
  02/19/19 
0505 02/18/19 
1546  137  
K 3.8 4.0  
 100 CO2 27 30 BUN 15 13 CREA 0.73 0.87 * 100 CA 7.8* 8.9 Recent Labs  
  02/18/19 
1546 SGOT 19 ALT 17 AP 55 TBILI 0.3 TP 7.2 ALB 3.2*  
GLOB 4.0 No results for input(s): INR, PTP, APTT in the last 72 hours. No lab exists for component: INREXT No results for input(s): FE, TIBC, PSAT, FERR in the last 72 hours. No results found for: FOL, RBCF No results for input(s): PH, PCO2, PO2 in the last 72 hours. No results for input(s): CPK, CKNDX, TROIQ in the last 72 hours. No lab exists for component: CPKMB No results found for: CHOL, CHOLX, CHLST, CHOLV, HDL, LDL, LDLC, DLDLP, TGLX, TRIGL, TRIGP, CHHD, CHHDX No results found for: [place] [employer] Lab Results Component Value Date/Time  Color YELLOW/STRAW 02/18/2019 05:35 PM  
 Appearance CLEAR 02/18/2019 05:35 PM  
 Specific gravity 1.025 02/18/2019 05:35 PM  
 pH (UA) 6.5 02/18/2019 05:35 PM  
 Protein 100 (A) 02/18/2019 05:35 PM  
 Glucose NEGATIVE  02/18/2019 05:35 PM  
 Ketone NEGATIVE  02/18/2019 05:35 PM  
 Bilirubin NEGATIVE  02/18/2019 05:35 PM  
 Urobilinogen 0.2 02/18/2019 05:35 PM  
 Nitrites NEGATIVE  02/18/2019 05:35 PM  
 Leukocyte Esterase NEGATIVE  02/18/2019 05:35 PM  
 Epithelial cells FEW 02/18/2019 05:35 PM  
 Bacteria NEGATIVE  02/18/2019 05:35 PM  
 WBC 0-4 02/18/2019 05:35 PM  
 RBC  02/18/2019 05:35 PM  
 
 
 
Medications Reviewed:  
 
Current Facility-Administered Medications Medication Dose Route Frequency  citalopram (CELEXA) tablet 40 mg  40 mg Oral DAILY  docusate sodium (COLACE) capsule 300 mg  300 mg Oral QHS  therapeutic multivitamin (THERAGRAN) tablet 1 Tab  1 Tab Oral QHS  sodium chloride (NS) flush 5-40 mL  5-40 mL IntraVENous Q8H  
 sodium chloride (NS) flush 5-40 mL  5-40 mL IntraVENous PRN  
 0.9% sodium chloride infusion  75 mL/hr IntraVENous CONTINUOUS  
 ondansetron (ZOFRAN) injection 4 mg  4 mg IntraVENous Q4H PRN  
 heparin (porcine) injection 5,000 Units  5,000 Units SubCUTAneous Q8H  
 pantoprazole (PROTONIX) tablet 40 mg  40 mg Oral ACB  oseltamivir (TAMIFLU) capsule 75 mg  75 mg Oral Q12H  
 methylphenidate HCl (RITALIN) tablet 5 mg  5 mg Oral BID  fentaNYL (DURAGESIC) 100 mcg/hr patch 1 Patch  1 Patch TransDERmal Q72H  cefepime (MAXIPIME) 2 g in 0.9% sodium chloride (MBP/ADV) 100 mL  2 g IntraVENous Q8H  
 levoFLOXacin (LEVAQUIN) 750 mg in D5W IVPB  750 mg IntraVENous Q24H  
 albuterol-ipratropium (DUO-NEB) 2.5 MG-0.5 MG/3 ML  3 mL Nebulization Q4H RT  
 
______________________________________________________________________ EXPECTED LENGTH OF STAY: 2d 9h 
ACTUAL LENGTH OF STAY:          1 Krishna Akers MD

## 2019-02-20 NOTE — CONSULTS
[address]    Name:  Kacie Bernabe  MR#:  [id number]  :  1955  ACCOUNT #:  [de-identified]  DATE OF SERVICE:  2019    ATTENDING PHYSICIAN:  Cornelius Castelan MD    CHIEF COMPLAINT:  Fever and confusion. REASON FOR CONSULTATION:  Fever. Consultation was requested by Dr. Margie Doyle. History was obtained by interviewing  the patient and his wife and review of the medical records. HISTORY OF PRESENT ILLNESS:  This patient is a 43-year-old white male, who is  diagnosed with multiple myeloma in . He underwent stem cell transplant at [facility] in  2016 and subsequently he has done well and he is felt to be in remission. The  patient also has a history of hyperlipidemia, arthritis, GERD, migraine headaches,  organic heart disease with a left bundle branch block, and depression. This patient is maintained on Pomalyst.  He has been doing well recently. On  02/15/2019, he did not feel very well and recalls having a headache. The following  day, on 2019, he felt better and went to work at [employer]. However, on  2019, he once again felt bad. That evening his wife thought he was little  confused. The following day on 2019, the patient did not feel well and stayed  in bed. His wife went to work. When she returned in by noon, she states that the  patient \"could not make a sentence. \"  He was confused, had a fever up to 104 degrees  Fahrenheit. She took him to the emergency room. No clear source of fever was found. The patient was admitted to the hospital and started on cefepime and Levaquin. However, as part of his workup, a rapid influenza swab was done and returned positive  for influenza B. The patient was also started on Tamiflu. Today, he seems  significantly better. His temperature is down and he feels much better than he did  on admission. He has had a persistent cough with his illness. It has also ached all  over.   He states he felt \"like I had the flu. \"    PAST MEDICAL HISTORY:  Tobacco, nonalcohol, rare social use. MEDICATIONS PRIOR TO ADMISSION:  1. Albuterol inhaler. 2.  Dulcolax suppository every other day. 3.  Vitamin D and calcium one p.o. nightly. 4.  Celexa 40 mg p.o. daily. 5.  Colace. 6.  Nexium 40 mg p.o. daily. 7.  Duragesic patch 100 mcg per hour. 8.  Hydromorphone 2 mg every 6 hours p.r.n. pain. 9.  Claritin 10 mg p.o. daily. 10.  Methylphenidate 5 mg p.o. b.i.d.  11.  Pomalyst capsule. 12.  Multivitamin. ALLERGIES:  PERCOCET - RASH. ILLNESSES:  1.  Multiple myeloma diagnosed in 2015 and treated by stem cell transplant at [facility] in  2016 - he is now felt to be in remission. 2.  Hyperlipidemia. 3.  History of arthritis. 4.  GERD. 5.  History of migraine headaches. 6.  Depression. 7.  Organic heart disease with history of left bundle branch block. SURGERIES:  1. Left knee arthroscopy. 2.  Right carpal tunnel syndrome surgery. SOCIAL HISTORY:  The patient lives in [place] with his wife. He works for  [employer]. FAMILY HISTORY:  Otherwise unremarkable. REVIEW OF SYSTEMS:  CONSTITUTIONAL:  He did have fever of 104 on admission. HEENT:  No headache or visual change and no sore throat. LYMPHATIC:  No history of adenopathy. DERMATOLOGIC:  No recent rashes. RESPIRATORY:  The patient has had a cough over the past couple of days. CARDIOVASCULAR:  Chest pain. GASTROINTESTINAL:  No nausea, vomiting, diarrhea. GENITOURINARY:  No dysuria. MUSCULOSKELETAL:  It has ached overall. NEUROLOGIC:  No history of confusion, seizure, stroke, or syncope before the onset of  this illness. Today, his mental status seems back to normal according to his wife. PHYSICAL EXAMINATION:  GENERAL:  No acute distress. VITAL SIGNS:  Blood pressure is 128/62, heart rate 74, respiratory rate 16, he is  afebrile since temperature of 102.5 on admission. Weight 162 pounds.   HEENT:  Sclerae and conjunctivae benign, EOMI, oropharynx benign. NECK:  Supple. NODES:  No adenopathy. SKIN:  No rashes. LUNGS:  Clear to A and P.  CARDIOVASCULAR:  Regular rate and rhythm with no murmurs. ABDOMEN:  Soft, nondistended, no masses. Bowel sounds are present. BACK:  No CVA tenderness. EXTREMITIES:  No cellulitis. MUSCULOSKELETAL:  Muscles are nontender and joints benign. NEUROLOGIC:  Alert and oriented. LABORATORY DATA:  CBC reveals a hemoglobin of 10.4, white count 2100 with absolute  neutrophil count 1200, and platelets 843,160. Chemistry data revealed, BUN 13,  creatinine 0.87 with normal liver function test.  Rapid screen for influenza B was  positive. RADIOLOGY:  Chest x-ray is clear. IMPRESSION:  1. Fever and a positive influenza B screen:  His illness is consistent with  influenza. The patient did not get the flu vaccine this year. He has potential  exposure to influenza with students [employer]. He seems better today after  only a couple of doses of Tamiflu. 2.  Multiple myeloma - in remission after a stem cell transplant. 3.  Hyperlipidemia. 4.  Anemia. 5.  Gastroesophageal reflux disease. 6.  History of migraine headaches. PLAN:  1.  I agree with Tamiflu. 2.  I am agreeing to stop antibiotics tomorrow if the blood culture is still  negative. Thank you very much for allowing us to see this patient with you.       Gigi Garcia MD      [id number]  D:  02/20/2019 1:59  T:  02/20/2019 7:13  JOB #:  [id number]  CC:  MD Lynn Quinn MD Candia Edelson, MD

## 2019-02-20 NOTE — ROUTINE PROCESS
Bedside and Verbal shift change report given to Juan Verde RN (oncoming nurse) by Shanon Ivy RN (offgoing nurse). Report included the following information SBAR, Kardex, Intake/Output, MAR, Recent Results and Cardiac Rhythm SR, BBB.

## 2019-02-20 NOTE — PROGRESS NOTES
Problem: Risk for Spread of Infection Goal: Prevent transmission of infectious organism to others Prevent the transmission of infectious organisms to other patients, staff members, and visitors. Outcome: Progressing Towards Goal 
Correct isolation order has been placed. Proper isolation precautions placed on patient's door. Proper mask and hand hygiene has been utlizied by all the staffs and visitors.

## 2019-02-20 NOTE — PROGRESS NOTES
.. TRANSFER - IN REPORT: 
 
Verbal report received from Evnone Mcallister RN (name) on Geryl Tino  being received from [facility] (unit) for routine progression of care Report consisted of patients Situation, Background, Assessment and  
Recommendations(SBAR). Information from the following report(s) SBAR, Kardex and MAR was reviewed with the receiving nurse. Opportunity for questions and clarification was provided. Assessment completed upon patients arrival to unit and care assumed.

## 2019-02-20 NOTE — CDMP QUERY
Pt admitted with Influenza/Pt noted to have a fever and Leukopenia. If possible, please document in the progress notes and d/c summary if you are evaluating and / or treating any of the following: 
 
=>Sepsis, present on admission, suspected or probable causative organism =>Influenza 
=>Sepsis, now resolved, suspected or probable causative organism (please specify) =>Sepsis, not present on admission, suspected or probable causative organism (please specify) =>No Sepsis, suspected or probable localized infection (please specify) =>Sepsis was ruled out (include corresponding diagnosis for patients clinical picture and treatment) =>Other, please specify 
=>Clinically unable to determine The medical record reflects the following: 
Risk Factors: Influenza B Clinical Indicators:  
Temp: 102. 5/101.3 LA 1.37 WBC 2.0/2.1/1.9 Treatment: Fluid bolus 2L, Maxipime 2 g IV q8h, Levaquin 750 mg IV q24h, Zosyn 4.5 g, Zithromax  500 mg IV Thank you Robert Vázquez 
[facility] 
[phone]

## 2019-02-20 NOTE — PROGRESS NOTES
TRANSFER - IN REPORT: 
 
Verbal report received from Tasha(name) on Ben Bustos  being received from [facility](unit) for routine progression of care Report consisted of patients Situation, Background, Assessment and  
Recommendations(SBAR). Information from the following report(s) SBAR, OR Summary, Procedure Summary, Intake/Output, MAR and Cardiac Rhythm Normal Sinus was reviewed with the receiving nurse. Opportunity for questions and clarification was provided. Assessment completed upon patients arrival to unit and care assumed.

## 2019-02-20 NOTE — PROGRESS NOTES
Infectious Diseases Consultation Please see dictated note Impression 1. Fever and positive Influenza B screen: Illness is c/w Influenza. He did not get the vaccine 2. Multiple Myeloma -- in remission after stem cell transplant 3. Hyperlipidemia 4. GERD 5. Hx of migraine HA Recommend: 1. Agree with Tamiflu 
 
2. D/C antibiotics tomorrow if blood cultures are still negative Thanks,  
Unique Lorenzana MD 
2/19/2019 
10:15 PM

## 2019-02-21 LAB
ALBUMIN SERPL ELPH-MCNC: 2.6 G/DL (ref 2.9–4.4)
ALBUMIN/GLOB SERPL: 1 {RATIO} (ref 0.7–1.7)
ALPHA1 GLOB SERPL ELPH-MCNC: 0.2 G/DL (ref 0–0.4)
ALPHA2 GLOB SERPL ELPH-MCNC: 0.7 G/DL (ref 0.4–1)
B-GLOBULIN SERPL ELPH-MCNC: 0.8 G/DL (ref 0.7–1.3)
GAMMA GLOB SERPL ELPH-MCNC: 1 G/DL (ref 0.4–1.8)
GLOBULIN SER CALC-MCNC: 2.7 G/DL (ref 2.2–3.9)
KAPPA LC FREE SER-MCNC: 59.9 MG/L (ref 3.3–19.4)
KAPPA LC FREE/LAMBDA FREE SER: 0.46 {RATIO} (ref 0.26–1.65)
LAMBDA LC FREE SERPL-MCNC: 130 MG/L (ref 5.7–26.3)
M PROTEIN SERPL ELPH-MCNC: ABNORMAL G/DL
PROT SERPL-MCNC: 5.3 G/DL (ref 6–8.5)

## 2019-02-21 NOTE — DISCHARGE INSTRUCTIONS
Discharge Instructions       PATIENT ID: Sary Butcher  MRN: [id number]   YOB: 1955    DATE OF ADMISSION: 2/18/2019  4:45 PM    DATE OF DISCHARGE: 2/20/2019    PRIMARY CARE PROVIDER: Maria Ines Larsen MD     ATTENDING PHYSICIAN: Thony Corral MD  DISCHARGING PROVIDER: Katey Jacob MD    To contact this individual call [phone] and ask the  to page. If unavailable ask to be transferred the Adult Hospitalist Department. DISCHARGE DIAGNOSES :Influenza B    CONSULTATIONS: IP CONSULT TO ONCOLOGY  IP CONSULT TO ONCOLOGY  IP CONSULT TO INFECTIOUS DISEASES  IP CONSULT TO HOSPITALIST    PROCEDURES/SURGERIES: * No surgery found *    PENDING TEST RESULTS:   At the time of discharge the following test results are still pending:none    Xr Chest Pa Lat    Result Date: 2/18/2019  IMPRESSION: 1. No pneumonia    Ct Chest Wo Cont    Result Date: 2/18/2019  IMPRESSION: 1. No acute process. 2. Widespread osseous metastatic disease without significant interval change. FOLLOW UP APPOINTMENTS:   Follow-up Information    None          ADDITIONAL CARE RECOMMENDATIONS:   1)If you develop worsening fever or cough or shortness of breath,return to the emergency room. 2)Stop taking pomalidomide for now. Follow with  in 1 week to discuss about when to restart pomalidomide. 3)Follow up with PCP in 1 week  4)Repeat CBC and BMP in 1 week  5)Take tamiflu for 4 more days as you have influenza    DIET: Regular Diet      ACTIVITY: Activity as tolerated    WOUND CARE: na    EQUIPMENT needed: na      DISCHARGE MEDICATIONS:   See Medication Reconciliation Form    · It is important that you take the medication exactly as they are prescribed. · Keep your medication in the bottles provided by the pharmacist and keep a list of the medication names, dosages, and times to be taken in your wallet. · Do not take other medications without consulting your doctor.        NOTIFY [address] OF THE FOLLOWING:   Fever over 101 degrees for 24 hours. Chest pain, shortness of breath, fever, chills, nausea, vomiting, diarrhea, change in mentation, falling, weakness, bleeding. Severe pain or pain not relieved by medications. Or, any other signs or symptoms that you may have questions about.       DISPOSITION:   X Home With:   OT  PT  HH  RN       SNF/Inpatient Rehab/LTAC    Independent/assisted living    Hospice    Other:           Signed:   Caitlin Fernandez MD  2/20/2019  2:09 PM

## 2019-02-21 NOTE — DISCHARGE SUMMARY
Discharge Summary       PATIENT ID: Suresh Gordillo  MRN: [id number]   YOB: 1955    DATE OF ADMISSION: 2/18/2019  4:45 PM    DATE OF DISCHARGE: 2/20/2019   PRIMARY CARE PROVIDER: Abdullahi Arredondo MD     ATTENDING PHYSICIAN: Krishna Akers MD    DISCHARGING PROVIDER: Krishan Akers MD    To contact this individual call [phone] and ask the  to page. If unavailable ask to be transferred the Adult Hospitalist Department. CONSULTATIONS: None    PROCEDURES/SURGERIES: * No surgery found *    ADMITTING DIAGNOSES & HOSPITAL COURSE:   59 y.o M with PMH of multiple myeloma on chemotherapy and history of sten cell transplant came to the hospital due to fever. Was found to have Flu.     #Influenza B:  -on tamiflu - to complete total of 5 days  -encouraged intake of fluids. -sepsis ruled out:  -patient had fever due to influenza. Has chronic leukopenia.       #Fever:  - likely due to viral illness influenza B  -he was started on empiric abx cefepime and levaquin at admission as he had low white count  -blood cultures remained negative, d/c abx. Chemo port site looks clean and dry. -ID consulted. Discussed with .     #Multiple myeloma  # Bicytopenia(anemia and low white count):likely from chemo vs multiple myeloma  -oncology following  -Discussed with  - patient was instructed to hold pomalidomide for now and to follow up with  in 1 week and then decide when to restart it.     #Chronic pain syndrome with underlying multiple myeloma:  -on fentanyl patch 100 mcg ordered.  -will use dilaudid -home med if break through pain     Depression:  -continue ritalin and celexa.         FOLLOW UP APPOINTMENTS:    Follow-up Information     Follow up With Specialties Details Why Contact Info    Abdullahi Arredondo MD [facility] In 1 week  [address] [place] [address]      Lakesha Yoo MD Hematology and Oncology, Hematology, Oncology In 1 week [address] [facility] [address] [phone]           ADDITIONAL CARE RECOMMENDATIONS:   1)If you develop worsening fever or cough or shortness of breath,return to the emergency room. 2)Stop taking pomalidomide for now. Follow with  in 1 week to discuss about when to restart pomalidomide. 3)Follow up with PCP in 1 week  4)Repeat CBC and BMP in 1 week  5)Take tamiflu for 4 more days as you have influenza    DIET: Regular Diet      ACTIVITY: Activity as tolerated    WOUND CARE: na    EQUIPMENT needed: na          DISCHARGE MEDICATIONS:  Discharge Medication List as of 2/20/2019  2:20 PM      START taking these medications    Details   oseltamivir (TAMIFLU) 75 mg capsule Take 1 Cap by mouth every twelve (12) hours for 4 days. , Print, Disp-8 Cap, R-0         CONTINUE these medications which have NOT CHANGED    Details   albuterol (PROVENTIL HFA, VENTOLIN HFA, PROAIR HFA) 90 mcg/actuation inhaler Take 2 Puffs by inhalation every four (4) hours as needed for Wheezing., Historical Med      calcium-vitamin D (OYSTER SHELL) 500 mg(1,250mg) -200 unit per tablet Take 1 Tab by mouth nightly., Historical Med      therapeutic multivitamin (THERA) tablet Take 1 Tab by mouth nightly., Historical Med      bisacodyl (DULCOLAX, BISACODYL,) 5 mg EC tablet Take 5 mg by mouth every other day., Historical Med      pomalidomide (POMALYST PO) Take 1 Cap by mouth nightly. (21 days on; 7 days off. Current cycle started on 2-12-19), Historical Med      METHYLPHENIDATE PO Take 5 mg by mouth two (2) times a day., Historical Med      citalopram (CELEXA) 40 mg tablet Take 40 mg by mouth daily. , Historical Med      esomeprazole (NEXIUM) 40 mg capsule Take 40 mg by mouth Daily (before breakfast). , Historical Med      loratadine (CLARITIN) 10 mg tablet Take 10 mg by mouth daily. , Historical Med      fentaNYL (DURAGESIC) 100 mcg/hr PATCH 1 Patch by TransDERmal route every fourty-eight (48) hours. , Historical Med docusate sodium (STOOL SOFTENER) 100 mg capsule Take 300 mg by mouth nightly., Historical Med      hydromorphone HCl (HYDROMORPHONE PO) Take 2 mg by mouth every six (6) hours as needed for Pain., Historical Med               NOTIFY YOUR PHYSICIAN FOR ANY OF THE FOLLOWING:   Fever over 101 degrees for 24 hours. Chest pain, shortness of breath, fever, chills, nausea, vomiting, diarrhea, change in mentation, falling, weakness, bleeding. Severe pain or pain not relieved by medications. Or, any other signs or symptoms that you may have questions about. DISPOSITION:   X Home With:   OT  PT  HH  RN       Long term SNF/Inpatient Rehab    Independent/assisted living    Hospice    Other:       PATIENT CONDITION AT DISCHARGE:     Functional status    Poor     Deconditioned    X Independent      Cognition   X  Lucid     Forgetful     Dementia      Catheters/lines (plus indication)    Jean     PICC     PEG    X None      Code status    X Full code     DNR      PHYSICAL EXAMINATION AT DISCHARGE:  Constitutional:  No acute distress, cooperative, pleasant    ENT:  Oral mucous moist, oropharynx benign. Neck supple,    Resp:  CTA bilaterally. No wheezing/rhonchi/rales. No accessory muscle use   CV:  Regular rhythm, normal rate, no murmurs, gallops, rubs    GI:  Soft, non distended, non tender. normoactive bowel sounds, no hepatosplenomegaly     Musculoskeletal:  No edema, warm, 2+ pulses throughout    Neurologic:  Moves all extremities. AAOx3, CN II-XII reviewed                         Psych:  Good insight, Not anxious nor agitated.              CHRONIC MEDICAL DIAGNOSES:  Problem List as of 2/20/2019 Date Reviewed: 2/18/2019          Codes Class Noted - Resolved    * (Principal) Influenza ICD-10-CM: J11.1  ICD-9-CM: 487.1  2/18/2019 - Present        Infection due to parainfluenza virus 4 ICD-10-CM: B34.8  ICD-9-CM: 079.89  8/27/2017 - Present        Multiple myeloma ([facility] ICD-10-CM: C90.00  ICD-9-CM: 203.00  8/27/2017 - Present        Acute hypokalemia ICD-10-CM: E87.6  ICD-9-CM: 276.8  8/25/2017 - Present        Sepsis ([address] ICD-10-CM: A41.9  ICD-9-CM: 038.9, 995.91  8/25/2017 - Present        RESOLVED: Tachycardia ICD-10-CM: R00.0  ICD-9-CM: 785.0  8/25/2017 - 8/27/2017              35  minutes were spent with the patient on counseling and coordination of care    Signed:   Mika Henson MD  2/21/2019  12:07 AM

## 2019-02-23 LAB
BACTERIA SPEC CULT: NORMAL
SERVICE CMNT-IMP: NORMAL

## 2020-02-19 ENCOUNTER — HOSPITAL ENCOUNTER (OUTPATIENT)
Dept: PET IMAGING | Age: 65
Discharge: HOME | End: 2020-02-19
Attending: INTERNAL MEDICINE
Payer: COMMERCIAL

## 2020-02-19 VITALS — BODY MASS INDEX: 23.1 KG/M2 | HEIGHT: 71 IN | WEIGHT: 165 LBS

## 2020-02-19 DIAGNOSIS — C90.00 MYELOMA ASSOCIATED AMYLOIDOSIS (HCC): ICD-10-CM

## 2020-02-19 DIAGNOSIS — E85.9 MYELOMA ASSOCIATED AMYLOIDOSIS (HCC): ICD-10-CM

## 2020-02-19 PROCEDURE — A9552 F18 FDG: HCPCS

## 2020-02-19 RX ORDER — SODIUM CHLORIDE 0.9 % (FLUSH) 0.9 %
10 SYRINGE (ML) INJECTION
Status: COMPLETED | OUTPATIENT
Start: 2020-02-19 | End: 2020-02-19

## 2020-02-19 RX ADMIN — Medication 10 ML: at 14:00

## 2021-04-08 ENCOUNTER — TRANSCRIBE ORDER (OUTPATIENT)
Dept: SCHEDULING | Age: 66
End: 2021-04-08

## 2021-04-08 DIAGNOSIS — E85.9 MYELOMA ASSOCIATED AMYLOIDOSIS (HCC): Primary | ICD-10-CM

## 2021-04-08 DIAGNOSIS — C90.00 MYELOMA ASSOCIATED AMYLOIDOSIS (HCC): Primary | ICD-10-CM

## 2021-04-09 ENCOUNTER — TRANSCRIBE ORDER (OUTPATIENT)
Dept: SCHEDULING | Age: 66
End: 2021-04-09

## 2021-04-09 DIAGNOSIS — E85.9 MYELOMA ASSOCIATED AMYLOIDOSIS (HCC): Primary | ICD-10-CM

## 2021-04-09 DIAGNOSIS — C90.00 MYELOMA ASSOCIATED AMYLOIDOSIS (HCC): Primary | ICD-10-CM

## 2021-05-12 ENCOUNTER — HOSPITAL ENCOUNTER (OUTPATIENT)
Dept: PET IMAGING | Age: 66
Discharge: HOME | End: 2021-05-12
Attending: INTERNAL MEDICINE
Payer: COMMERCIAL

## 2021-05-12 DIAGNOSIS — C90.00 MYELOMA ASSOCIATED AMYLOIDOSIS (HCC): ICD-10-CM

## 2021-05-12 DIAGNOSIS — E85.9 MYELOMA ASSOCIATED AMYLOIDOSIS (HCC): ICD-10-CM

## 2021-05-12 LAB
GLUCOSE BLD STRIP.AUTO-MCNC: 100 MG/DL (ref 65–117)
SERVICE CMNT-IMP: NORMAL

## 2021-05-12 PROCEDURE — A9552 F18 FDG: HCPCS

## 2022-03-18 PROBLEM — J11.1 INFLUENZA: Status: ACTIVE | Noted: 2019-02-18

## 2022-03-19 PROBLEM — B34.8 INFECTION DUE TO PARAINFLUENZA VIRUS 4: Status: ACTIVE | Noted: 2017-08-27

## 2022-03-19 PROBLEM — E87.6 ACUTE HYPOKALEMIA: Status: ACTIVE | Noted: 2017-08-25

## 2022-03-20 PROBLEM — C90.00 MULTIPLE MYELOMA (HCC): Status: ACTIVE | Noted: 2017-08-27

## 2022-03-20 PROBLEM — A41.9 SEPSIS (HCC): Status: ACTIVE | Noted: 2017-08-25

## 2022-05-12 ENCOUNTER — TRANSCRIBE ORDER (OUTPATIENT)
Dept: SCHEDULING | Age: 67
End: 2022-05-12

## 2022-05-12 DIAGNOSIS — C90.00 MYELOMA (HCC): Primary | ICD-10-CM

## 2022-06-03 ENCOUNTER — HOSPITAL ENCOUNTER (OUTPATIENT)
Dept: PET IMAGING | Age: 67
Discharge: HOME | End: 2022-06-03
Attending: INTERNAL MEDICINE
Payer: MEDICARE

## 2022-06-03 VITALS — BODY MASS INDEX: 25.34 KG/M2 | WEIGHT: 181 LBS | HEIGHT: 71 IN

## 2022-06-03 DIAGNOSIS — C90.00 MYELOMA (HCC): ICD-10-CM

## 2022-06-03 LAB
GLUCOSE BLD STRIP.AUTO-MCNC: 92 MG/DL (ref 65–117)
SERVICE CMNT-IMP: NORMAL

## 2022-06-03 PROCEDURE — A9552 F18 FDG: HCPCS

## 2022-06-03 RX ORDER — FLUDEOXYGLUCOSE F-18 200 MCI/ML
10 INJECTION INTRAVENOUS ONCE
Status: COMPLETED | OUTPATIENT
Start: 2022-06-03 | End: 2022-06-03

## 2022-06-03 RX ADMIN — FLUDEOXYGLUCOSE F-18 10.9 MILLICURIE: 200 INJECTION INTRAVENOUS at 14:10

## 2024-09-23 ENCOUNTER — HOSPITAL ENCOUNTER (OUTPATIENT)
Age: 69
Discharge: HOME | End: 2024-09-26
Payer: MEDICARE

## 2024-09-23 ENCOUNTER — HOSPITAL ENCOUNTER (OUTPATIENT)
Facility: HOSPITAL | Age: 69
Discharge: HOME | End: 2024-09-26
Attending: INTERNAL MEDICINE
Payer: MEDICARE

## 2024-09-23 VITALS — WEIGHT: 170 LBS | BODY MASS INDEX: 23.8 KG/M2 | HEIGHT: 71 IN

## 2024-09-23 DIAGNOSIS — M79.605 LEFT LEG PAIN: ICD-10-CM

## 2024-09-23 DIAGNOSIS — M25.551 RIGHT HIP PAIN: ICD-10-CM

## 2024-09-23 DIAGNOSIS — E85.9 MYELOMA ASSOCIATED AMYLOIDOSIS (HCC): ICD-10-CM

## 2024-09-23 DIAGNOSIS — C90.00 MYELOMA ASSOCIATED AMYLOIDOSIS (HCC): ICD-10-CM

## 2024-09-23 LAB
GLUCOSE BLD STRIP.AUTO-MCNC: 87 MG/DL (ref 65–117)
SERVICE CMNT-IMP: NORMAL

## 2024-09-23 PROCEDURE — 3430000000 HC RX DIAGNOSTIC RADIOPHARMACEUTICAL: Performed by: INTERNAL MEDICINE

## 2024-09-23 PROCEDURE — 78816 PET IMAGE W/CT FULL BODY: CPT

## 2024-09-23 PROCEDURE — 82962 GLUCOSE BLOOD TEST: CPT

## 2024-09-23 PROCEDURE — 73502 X-RAY EXAM HIP UNI 2-3 VIEWS: CPT

## 2024-09-23 PROCEDURE — A9609 HC RX DIAGNOSTIC RADIOPHARMACEUTICAL: HCPCS | Performed by: INTERNAL MEDICINE

## 2024-09-23 RX ORDER — FLUDEOXYGLUCOSE F-18 500 MCI/ML
10 INJECTION INTRAVENOUS
Status: COMPLETED | OUTPATIENT
Start: 2024-09-23 | End: 2024-09-23

## 2024-09-23 RX ADMIN — FLUDEOXYGLUCOSE F-18 10 MILLICURIE: 500 INJECTION INTRAVENOUS at 09:22

## 2025-04-07 ENCOUNTER — HOSPITAL ENCOUNTER (OUTPATIENT)
Facility: HOSPITAL | Age: 70
Discharge: HOME | End: 2025-04-09
Attending: INTERNAL MEDICINE
Payer: MEDICARE

## 2025-04-07 DIAGNOSIS — C90.00 OSTEOSCLEROTIC MYELOMA (HCC): ICD-10-CM

## 2025-04-07 DIAGNOSIS — R01.1 UNDIAGNOSED CARDIAC MURMURS: ICD-10-CM

## 2025-04-07 LAB
ECHO AO ASC DIAM: 3.8 CM
ECHO AO ROOT DIAM: 3.5 CM
ECHO AR MAX VEL PISA: 4.1 M/S
ECHO AV AREA PEAK VELOCITY: 1.9 CM2
ECHO AV AREA VTI: 1.9 CM2
ECHO AV MEAN GRADIENT: 10 MMHG
ECHO AV MEAN VELOCITY: 1.5 M/S
ECHO AV PEAK GRADIENT: 18 MMHG
ECHO AV PEAK VELOCITY: 2.1 M/S
ECHO AV REGURGITANT PHT: 438 MS
ECHO AV VELOCITY RATIO: 0.52
ECHO AV VTI: 44.3 CM
ECHO LA DIAMETER: 3.8 CM
ECHO LA TO AORTIC ROOT RATIO: 1.09
ECHO LA VOL A-L A2C: 57 ML (ref 18–58)
ECHO LA VOL A-L A4C: 79 ML (ref 18–58)
ECHO LA VOL BP: 69 ML (ref 18–58)
ECHO LA VOL MOD A2C: 49 ML (ref 18–58)
ECHO LA VOL MOD A4C: 72 ML (ref 18–58)
ECHO LA VOLUME AREA LENGTH: 78 ML
ECHO LV EF PHYSICIAN: 50 %
ECHO LV FRACTIONAL SHORTENING: 35 % (ref 28–44)
ECHO LV INTERNAL DIMENSION DIASTOLIC: 3.7 CM (ref 4.2–5.9)
ECHO LV INTERNAL DIMENSION SYSTOLIC: 2.4 CM
ECHO LV IVSD: 1.2 CM (ref 0.6–1)
ECHO LV MASS 2D: 138.2 G (ref 88–224)
ECHO LV POSTERIOR WALL DIASTOLIC: 1.1 CM (ref 0.6–1)
ECHO LV RELATIVE WALL THICKNESS RATIO: 0.59
ECHO LVOT AREA: 3.5 CM2
ECHO LVOT AV VTI INDEX: 0.53
ECHO LVOT DIAM: 2.1 CM
ECHO LVOT MEAN GRADIENT: 3 MMHG
ECHO LVOT PEAK GRADIENT: 5 MMHG
ECHO LVOT PEAK VELOCITY: 1.1 M/S
ECHO LVOT SV: 82 ML
ECHO LVOT VTI: 23.7 CM
ECHO MV A VELOCITY: 0.62 M/S
ECHO MV AREA PHT: 3.8 CM2
ECHO MV E DECELERATION TIME (DT): 201.9 MS
ECHO MV E VELOCITY: 0.61 M/S
ECHO MV E/A RATIO: 0.98
ECHO MV PRESSURE HALF TIME (PHT): 58.6 MS
ECHO RV INTERNAL DIMENSION: 3.3 CM
ECHO TV REGURGITANT MAX VELOCITY: 2.67 M/S
ECHO TV REGURGITANT PEAK GRADIENT: 29 MMHG

## 2025-04-07 PROCEDURE — 93306 TTE W/DOPPLER COMPLETE: CPT

## 2025-04-07 PROCEDURE — 93306 TTE W/DOPPLER COMPLETE: CPT | Performed by: SPECIALIST
